# Patient Record
Sex: FEMALE | Race: WHITE | NOT HISPANIC OR LATINO | Employment: OTHER | URBAN - METROPOLITAN AREA
[De-identification: names, ages, dates, MRNs, and addresses within clinical notes are randomized per-mention and may not be internally consistent; named-entity substitution may affect disease eponyms.]

---

## 2019-09-17 PROBLEM — I48.20 CHRONIC ATRIAL FIBRILLATION: Status: ACTIVE | Noted: 2019-09-17

## 2019-09-17 PROBLEM — E04.2 MULTINODULAR GOITER: Status: ACTIVE | Noted: 2019-09-17

## 2019-09-17 PROBLEM — E05.90 HYPERTHYROIDISM: Status: ACTIVE | Noted: 2019-09-17

## 2019-09-17 PROBLEM — I10 ESSENTIAL HYPERTENSION: Status: ACTIVE | Noted: 2019-09-17

## 2022-08-18 ENCOUNTER — OFFICE VISIT (OUTPATIENT)
Dept: NEUROSURGERY | Facility: CLINIC | Age: 67
End: 2022-08-18
Payer: MEDICARE

## 2022-08-18 VITALS
HEIGHT: 68 IN | RESPIRATION RATE: 18 BRPM | SYSTOLIC BLOOD PRESSURE: 116 MMHG | HEART RATE: 64 BPM | DIASTOLIC BLOOD PRESSURE: 76 MMHG | BODY MASS INDEX: 31.67 KG/M2 | WEIGHT: 209 LBS

## 2022-08-18 DIAGNOSIS — M54.32 SCIATICA OF LEFT SIDE: ICD-10-CM

## 2022-08-18 PROCEDURE — 99205 OFFICE O/P NEW HI 60 MIN: CPT | Mod: S$PBB,,, | Performed by: STUDENT IN AN ORGANIZED HEALTH CARE EDUCATION/TRAINING PROGRAM

## 2022-08-18 PROCEDURE — 99205 PR OFFICE/OUTPT VISIT, NEW, LEVL V, 60-74 MIN: ICD-10-PCS | Mod: S$PBB,,, | Performed by: STUDENT IN AN ORGANIZED HEALTH CARE EDUCATION/TRAINING PROGRAM

## 2022-08-18 RX ORDER — GABAPENTIN 300 MG/1
CAPSULE ORAL
Qty: 90 CAPSULE | Refills: 1 | Status: SHIPPED | OUTPATIENT
Start: 2022-08-18 | End: 2023-05-19

## 2022-08-18 NOTE — PROGRESS NOTES
"  [facility]  Clinic Consult     Consult Requested By: Asha Cheung MD  PCP: Flores Pinon MD    SUBJECTIVE:     Chief Complaint:   Chief Complaint   Patient presents with    Lumbar Spine Pain (L-Spine)     Patient presents to clinic with c/o L sided LBP that radiates into the leg.  Patient states she was on vacation and used a short toilet, and when she got up, she felt a "pulling" sensation in the back. Patient states her L leg is sore and has foot numbness.  She also reports feeling "extra cold".        History of Present Illness:  Astrid Camacho is a 67 y.o. female with HTN, CAD, afib maintained on Xarelto, hypothyroidism, DM who presents for evaluation of leg pain and weakness. Patient reports pulling something in the left low back while she was on vacation at the end of July. She used a restroom with a low toilet and when she went to stand she felt pain in the left low back/buttock. The following day she had severe pain in the left leg. The pain is present mostly in the left distal leg, in the distal L5 distribution. She will also experience pain in the buttocks. She saw a chiropractor who is also a NP while out of town who did massage and prescribed a steroid pack. She reports no improvement. She sought evaluation in at the ED upon return and did not improve with pain medication. She was evaluated by ortho and referred to our office after completing MRI. She notes onset of weakness in the left foot 1 week ago. She also reports numbness in the foot and feeling as though her foot is extremely cold. The pain is constant. It keeps her up at night.     Pertinent and recent history, provider evaluations, imaging and data reviewed in EPIC        Past Medical History:   Diagnosis Date    Atrial fibrillation 08/11/2012    Bell's palsy     Coronary artery disease     HTN (hypertension) 05/08/2002    [facility]     Hyperglycemia     Nerve palsy     3rd     " Situational [facility] 10/29/2010    [facility]     Situational depression 10/29/2010    from [facility]      Past Surgical History:   Procedure Laterality Date    APPENDECTOMY      CORONARY STENT PLACEMENT  2005    1 stent from [facility]    HYSTERECTOMY      from [facility] record    THYROIDECTOMY  10/13/2019     Family History   Problem Relation Age of Onset    Hypertension Mother     Pacemaker/defibrilator Mother     Heart attack Father     Prostate cancer Father      Social History     Tobacco Use    Smoking status: Never Smoker    Smokeless tobacco: Never Used   Substance Use Topics    Alcohol use: Yes     Alcohol/week: 1.0 standard drink     Types: 1 Glasses of wine per week     Comment: very little       Review of patient's allergies indicates:   Allergen Reactions    Effexor [venlafaxine]     Codeine Nausea And Vomiting       Current Outpatient Medications:     ALPRAZolam (XANAX) 1 MG tablet, Take 1 mg by mouth as needed. , Disp: , Rfl:     amLODIPine (NORVASC) 5 MG tablet, TAKE 1 TABLET BY MOUTH ONCE DAILY AS DIRECTED FOR 30 DAYS, Disp: , Rfl:     calcitRIOL (ROCALTROL) 0.5 MCG Cap, Take 0.5 mcg by mouth once daily. , Disp: , Rfl:     furosemide (LASIX) 20 MG tablet, Take 20 mg by mouth as needed., Disp: , Rfl:     HYDROcodone-acetaminophen (NORCO) 7.5-325 mg per tablet, Take 1 tablet by mouth every 6 (six) hours as needed for Pain., Disp: 30 tablet, Rfl: 0    levothyroxine (SYNTHROID) 150 MCG tablet, Take 150 mcg by mouth Daily. , Disp: , Rfl:     LIDOcaine (LIDODERM) 5 %, Place 1 patch onto the skin once daily. Remove & Discard patch within 12 hours or as directed by MD, Disp: 15 patch, Rfl: 0    losartan-hydrochlorothiazide 100-25 mg (HYZAAR) 100-25 mg per tablet, Take 1 tablet by mouth once daily., Disp: , Rfl: 3    naproxen (NAPROSYN) 500 MG tablet, Take 1 tablet (500 mg total) by mouth 2 (two) times daily with meals., Disp: 10 tablet, Rfl: 0    oxyCODONE-acetaminophen (PERCOCET)  mg per tablet,  "Take 1 tablet by mouth every 6 (six) hours as needed for Pain., Disp: 25 tablet, Rfl: 0    sotalol (BETAPACE) 120 MG Tab, Take 80 mg by mouth once daily., Disp: , Rfl:     XARELTO 20 mg Tab, TAKE 1 TABLET EVERY DAY WITH FOOD ONCE A DAY ORALLY 90 DAYS, Disp: , Rfl: 2    Review of Systems:   Constitutional: no fever, chills or night sweats. No changes in weight   Eyes: no visual changes   ENT: no nasal congestion or sore throat   Respiratory: no cough or shortness of breath   Cardiovascular: no chest pain or palpitations   Gastrointestinal: no nausea or vomiting   Genitourinary: no hematuria or dysuria   Integument/Breast: no rash or pruritis   Hematologic/Lymphatic: no easy bruising or lymphadenopathy   Musculoskeletal: +back pain   Neurological: no seizures or tremors +weakness   Behavioral/Psych: no auditory or visual hallucinations   Endocrine: no heat or cold intolerance         OBJECTIVE:     Vital Signs (Most Recent):  Pulse: 64 (08/18/22 1315)  Resp: 18 (08/18/22 1315)  BP: 116/76 (08/18/22 1315)  Estimated body mass index is 31.78 kg/m² as calculated from the following:    Height as of this encounter: 5' 8" (1.727 m).    Weight as of this encounter: 94.8 kg (208 lb 15.9 oz).    Physical Exam:   General: well developed, well nourished, no distress.   Neurologic: Alert and oriented. Thought content appropriate. GCS 15.   Language: No aphasia  Speech: No dysarthria  Head: normocephalic, atraumatic  Eyes: EOMI.  Neck: trachea midline, no JVD   Cardiovascular: no LE edema  Pulmonary: normal respirations, no signs of respiratory distress  Abdomen: non-distended  Sensory: allodynia left distal lateral leg   Skin: Skin is warm, dry and intact.    Motor Strength: Moves all extremities spontaneously with good tone. No abnormal movements seen.     Strength  Deltoids Triceps Biceps Wrist Extension Wrist Flexion Hand  Interossei    Upper: R 5/5 5/5 5/5 5/5 5/5 5/5 5/5     L 5/5 5/5 5/5 5/5 5/5 5/5 5/5      Iliopsoas " Quadriceps Knee  Flexion Tibialis  anterior Gastro- cnemius EHL Foot Inversion Foot Eversion   Lower: R 5/5 5/5 5/5 5/5 5/5 5/5      L 5/5 5/5 5/5 4-/5 5/5 4/5 4/5 5/5     DTR's: 1+  Gait: antalgic     Cannot stand on left heel. Able to stand on toes           Diagnostic Results:  I have independently reviewed the following imaging:  MRI: Reviewed  r    L4-5 , low grade 1 spondy normal disc height, facet arthropathy without central stenosis  Moderate lateral recess; [facility] sequestered fragment in the proximal foramin  Right L5-S1 disc osteophyte with PL stenosis      ASSESSMENT/PLAN:     Sciatica of left side  -     Ambulatory referral/consult to Neurosurgery        Astrid Camacho is a 67 y.o. female  With subacute left leg pain and incomplete dorsiflexion weakness, pain is severe  She is ambulating independent with subtle but true weakness  Hx of diabetes  ddx includes Lumbar radiculopathy vs peripheral neuropathy  Lumbar MRI without significant mass effect   L4-5 and 5-1 spondyslosis  However, though not reported by radiology ,   Her MRI is suspicious for a small left L4-5 sequestered disc fragment 4x6 mm  In the proximal foramin, not clearly correlating with L5 compression or large but on left just near the axilla of L4 in combo with 4-5 lateral recess  If so likely very significant inflammatory component as much as mass effect      Options:  1.  Left 4-5 LEELA diagnostic/theraputic , +/- EMG/NCS r/o periphiperal neur    If no lasting - has been counseled on sx  Needs dynamic xrays    2. Surgery reviewed , if no motion on spondy-- Left decompression, exploration discectomy L4-5    She wants to try LEELA   Hx Afib /cardiac needs to be off xarelto; stent over a decade ago no new symptoms          Will let us know response of if new s/s      The diagnosis, goals, limitations, risks and benefits of surgery and alternative treatment options where discussed at length (pros/cons). All questions/concerns  were addressed. The patient has verbalized a good understanding of the diagnosis, the potential procedure, anticipated post-operative course, overall expectations, and risks including but not limited to: spinal cord or nerve root injury/paralysis, death, nerve injury leading to pain or neurological deficit, csf leak, vascular injury or serious bleeding/need for blood product transfusion/stroke, wrong level surgery, chronic pain/failure to improve or worsening of symptoms, infection, need for further surgery at the same or different levels; medical (eg Heart attack, blood clot, infection) and anesthetic complications.                  Patient verbalized understanding of plan. Encouraged to call with any questions or concerns.     This note was partially dictated using voice recognition software, so please excuse any errors that were not corrected.

## 2022-08-18 NOTE — H&P (VIEW-ONLY)
"  [facility]  Clinic Consult     Consult Requested By: Asha Cheung MD  PCP: Flores Pinon MD    SUBJECTIVE:     Chief Complaint:   Chief Complaint   Patient presents with    Lumbar Spine Pain (L-Spine)     Patient presents to clinic with c/o L sided LBP that radiates into the leg.  Patient states she was on vacation and used a short toilet, and when she got up, she felt a "pulling" sensation in the back. Patient states her L leg is sore and has foot numbness.  She also reports feeling "extra cold".        History of Present Illness:  Astrid Camacho is a 67 y.o. female with HTN, CAD, afib maintained on Xarelto, hypothyroidism, DM who presents for evaluation of leg pain and weakness. Patient reports pulling something in the left low back while she was on vacation at the end of July. She used a restroom with a low toilet and when she went to stand she felt pain in the left low back/buttock. The following day she had severe pain in the left leg. The pain is present mostly in the left distal leg, in the distal L5 distribution. She will also experience pain in the buttocks. She saw a chiropractor who is also a NP while out of town who did massage and prescribed a steroid pack. She reports no improvement. She sought evaluation in at the ED upon return and did not improve with pain medication. She was evaluated by ortho and referred to our office after completing MRI. She notes onset of weakness in the left foot 1 week ago. She also reports numbness in the foot and feeling as though her foot is extremely cold. The pain is constant. It keeps her up at night.     Pertinent and recent history, provider evaluations, imaging and data reviewed in EPIC        Past Medical History:   Diagnosis Date    Atrial fibrillation 08/11/2012    Bell's palsy     Coronary artery disease     HT[facility]hypertension[facility]/08/2002    from [facility]     Hyperglycemia     Nerve palsy     3rd     " [facility] 10/29/2010    [facility]     [facility]uational depression 10/29/2010    [facility]      Past Surgical History:   Procedure Laterality Date    APPENDECTOMY      CORONARY STENT PLACEMENT  2005    [facility] stent [facility]    HYSTERECTOMY      [facility]    THYROIDECTOMY  10/13/2019     Family History   Problem Relation Age of Onset    Hypertension Mother     Pacemaker/defibrilator Mother     Heart attack Father     Prostate cancer Father      Social History     Tobacco Use    Smoking status: Never Smoker    Smokeless tobacco: Never Used   Substance Use Topics    Alcohol use: Yes     Alcohol/week: 1.0 standard drink     Types: 1 Glasses of wine per week     Comment: very little       Review of patient's allergies indicates:   Allergen Reactions    Effexor [venlafaxine]     Codeine Nausea And Vomiting       Current Outpatient Medications:     ALPRAZolam (XANAX) 1 MG tablet, Take 1 mg by mouth as needed. , Disp: , Rfl:     amLODIPine (NORVASC) 5 MG tablet, TAKE 1 TABLET BY MOUTH ONCE DAILY AS DIRECTED FOR 30 DAYS, Disp: , Rfl:     calcitRIOL (ROCALTROL) 0.5 MCG Cap, Take 0.5 mcg by mouth once daily. , Disp: , Rfl:     furosemide (LASIX) 20 MG tablet, Take 20 mg by mouth as needed., Disp: , Rfl:     HYDROcodone-acetaminophen (NORCO) 7.5-325 mg per tablet, Take 1 tablet by mouth every 6 (six) hours as needed for Pain., Disp: 30 tablet, Rfl: 0    levothyroxine (SYNTHROID) 150 MCG tablet, Take 150 mcg by mouth Daily. , Disp: , Rfl:     LIDOcaine (LIDODERM) 5 %, Place 1 patch onto the skin once daily. Remove & Discard patch within 12 hours or as directed by MD, Disp: 15 patch, Rfl: 0    losartan-hydrochlorothiazide 100-25 mg (HYZAAR) 100-25 mg per tablet, Take 1 tablet by mouth once daily., Disp: , Rfl: 3    naproxen (NAPROSYN) 500 MG tablet, Take 1 tablet (500 mg total) by mouth 2 (two) times daily with meals., Disp: 10 tablet, Rfl: 0    oxyCODONE-acetaminophen (PERCOCET)  mg per tablet,  "Take 1 tablet by mouth every 6 (six) hours as needed for Pain., Disp: 25 tablet, Rfl: 0    sotalol (BETAPACE) 120 MG Tab, Take 80 mg by mouth once daily., Disp: , Rfl:     XARELTO 20 mg Tab, TAKE 1 TABLET EVERY DAY WITH FOOD ONCE A DAY ORALLY 90 DAYS, Disp: , Rfl: 2    Review of Systems:   Constitutional: no fever, chills or night sweats. No changes in weight   Eyes: no visual changes   ENT: no nasal congestion or sore throat   Respiratory: no cough or shortness of breath   Cardiovascular: no chest pain or palpitations   Gastrointestinal: no nausea or vomiting   Genitourinary: no hematuria or dysuria   Integument/Breast: no rash or pruritis   Hematologic/Lymphatic: no easy bruising or lymphadenopathy   Musculoskeletal: +back pain   Neurological: no seizures or tremors +weakness   Behavioral/Psych: no auditory or visual hallucinations   Endocrine: no heat or cold intolerance         OBJECTIVE:     Vital Signs (Most Recent):  Pulse: 64 (08/18/22 1315)  Resp: 18 (08/18/22 1315)  BP: 116/76 (08/18/22 1315)  Estimated body mass index is 31.78 kg/m² as calculated from the following:    Height as of this encounter: 5' 8" (1.727 m).    Weight as of this encounter: 94.8 kg (208 lb 15.9 oz).    Physical Exam:   General: well developed, well nourished, no distress.   Neurologic: Alert and oriented. Thought content appropriate. GCS 15.   Language: No aphasia  Speech: No dysarthria  Head: normocephalic, atraumatic  Eyes: EOMI.  Neck: trachea midline, no JVD   Cardiovascular: no LE edema  Pulmonary: normal respirations, no signs of respiratory distress  Abdomen: non-distended  Sensory: allodynia left distal lateral leg   Skin: Skin is warm, dry and intact.    Motor Strength: Moves all extremities spontaneously with good tone. No abnormal movements seen.     Strength  Deltoids Triceps Biceps Wrist Extension Wrist Flexion Hand  Interossei    Upper: R 5/5 5/5 5/5 5/5 5/5 5/5 5/5     L 5/5 5/5 5/5 5/5 5/5 5/5 5/5      Iliopsoas " Quadriceps Knee  Flexion Tibialis  anterior Gastro- cnemius EHL Foot Inversion Foot Eversion   Lower: R 5/5 5/5 5/5 5/5 5/5 5/5      L 5/5 5/5 5/5 4-/5 5/5 4/5 4/5 5/5     DTR's: 1+  Gait: antalgic     Cannot stand on left heel. Able to stand on toes           Diagnostic Results:  I have independently reviewed the following imaging:  MRI: Reviewed  r    L4-5 , low grade 1 spondy normal disc height, facet arthropathy without central stenosis  Moderate lateral recess; [facility] sequestered fragment in the proximal foramin  Right L5-S1 disc osteophyte with PL stenosis      ASSESSMENT/PLAN:     Sciatica of left side  -     Ambulatory referral/consult to Neurosurgery        Astrid Camacho is a 67 y.o. female  With subacute left leg pain and incomplete dorsiflexion weakness, pain is severe  She is ambulating independent with subtle but true weakness  Hx of diabetes  ddx includes Lumbar radiculopathy vs peripheral neuropathy  Lumbar MRI without significant mass effect   L4-5 and 5-1 spondyslosis  However, though not reported by radiology ,   Her MRI is suspicious for a small left L4-5 sequestered disc fragment 4x6 mm  In the proximal foramin, not clearly correlating with L5 compression or large but on left just near the axilla of L4 in combo with 4-5 lateral recess  If so likely very significant inflammatory component as much as mass effect      Options:  1.  Left 4-5 LEELA diagnostic/theraputic , +/- EMG/NCS r/o periphiperal neur    If no lasting - has been counseled on sx  Needs dynamic xrays    2. Surgery reviewed , if no motion on spondy-- Left decompression, exploration discectomy L4-5    She wants to try LEELA   Hx Afib /cardiac needs to be off xarelto; stent over a decade ago no new symptoms          Will let us know response of if new s/s      The diagnosis, goals, limitations, risks and benefits of surgery and alternative treatment options where discussed at length (pros/cons). All questions/concerns  were addressed. The patient has verbalized a good understanding of the diagnosis, the potential procedure, anticipated post-operative course, overall expectations, and risks including but not limited to: spinal cord or nerve root injury/paralysis, death, nerve injury leading to pain or neurological deficit, csf leak, vascular injury or serious bleeding/need for blood product transfusion/stroke, wrong level surgery, chronic pain/failure to improve or worsening of symptoms, infection, need for further surgery at the same or different levels; medical (eg Heart attack, blood clot, infection) and anesthetic complications.                  Patient verbalized understanding of plan. Encouraged to call with any questions or concerns.     This note was partially dictated using voice recognition software, so please excuse any errors that were not corrected.

## 2022-08-22 ENCOUNTER — TELEPHONE (OUTPATIENT)
Dept: PAIN MEDICINE | Facility: CLINIC | Age: 67
End: 2022-08-22
Payer: MEDICARE

## 2022-08-22 ENCOUNTER — TELEPHONE (OUTPATIENT)
Dept: NEUROSURGERY | Facility: CLINIC | Age: 67
End: 2022-08-22
Payer: MEDICARE

## 2022-08-22 NOTE — TELEPHONE ENCOUNTER
Discussed with pt that once Dr. Joyner reviews order message, we will reach back out to schedule. Pt verbalized understanding.

## 2022-08-22 NOTE — TELEPHONE ENCOUNTER
Type of Procedure/Injection - Lumbar Transforaminal Epidural  L4/5           Laterality - Left      Type of Sedation - Local      Need to hold medication - yes      Xarelto for 3 days      Clearance needed - yes      Follow up - 2 week

## 2022-08-22 NOTE — TELEPHONE ENCOUNTER
----- Message from Natalie Crump sent at 8/22/2022  8:45 AM CDT -----  Regarding: Advice  Contact: Patient  Type: Needs Medical Advice  Who Called:  Patient  Best Call Back Number: [phone] (home)   Additional Information: Patient is requesting a return call from the nurse to schedule her LEELA (need referral to Pain Management placed) and EMG (Order is under active request tab- I am unable to schedule. Patient states Dr Frey was sending this order outside of [facility]???. Please place the orders and contact the patient to schedule. Patient states these tests are URGENT. Thanks!

## 2022-08-22 NOTE — TELEPHONE ENCOUNTER
----- Message from Natalie Crump sent at 8/22/2022 12:18 PM CDT -----  Regarding: Appointment Request  Contact: Patient  Type: Appointment Request    Name of Caller:  Patient  Symptoms:  LEELA Injection  Best Call Back Number:  [phone] (home)   Additional Information:  Please contact the patient to schedule. Thanks!

## 2022-08-22 NOTE — TELEPHONE ENCOUNTER
----- Message from Natalie Crump sent at 8/22/2022 12:18 PM CDT -----  Regarding: Appointment Request  Contact: Patient  Type: Appointment Request    Name of Caller:  Patient  Symptoms:  LEELA Injection  Best Call Back Number:  [phone] (home)   Additional Information:  Please contact the patient to schedule. Thanks!

## 2022-08-22 NOTE — TELEPHONE ENCOUNTER
Pt was seen by Dr. Frey and referred to Pain management. Message forwarded to Dr. Joyner for review.

## 2022-08-23 ENCOUNTER — TELEPHONE (OUTPATIENT)
Dept: PAIN MEDICINE | Facility: CLINIC | Age: 67
End: 2022-08-23

## 2022-08-23 DIAGNOSIS — M54.16 LUMBAR RADICULOPATHY: Primary | ICD-10-CM

## 2022-08-23 RX ORDER — ALPRAZOLAM 0.5 MG/1
1 TABLET, ORALLY DISINTEGRATING ORAL ONCE AS NEEDED
Status: CANCELLED | OUTPATIENT
Start: 2022-08-26 | End: 2034-01-21

## 2022-08-23 NOTE — TELEPHONE ENCOUNTER
Spoke with patient and scheduled procedure, discussed all questions and instructions. Faxed clearance for medication, she states she is already holding her xeralto starting yesterday.

## 2022-08-26 ENCOUNTER — HOSPITAL ENCOUNTER (OUTPATIENT)
Facility: HOSPITAL | Age: 67
Discharge: HOME | End: 2022-08-26
Attending: ANESTHESIOLOGY | Admitting: ANESTHESIOLOGY
Payer: MEDICARE

## 2022-08-26 ENCOUNTER — HOSPITAL ENCOUNTER (OUTPATIENT)
Dept: RADIOLOGY | Facility: HOSPITAL | Age: 67
Discharge: HOME | End: 2022-08-26
Attending: ANESTHESIOLOGY
Payer: MEDICARE

## 2022-08-26 DIAGNOSIS — M54.16 LUMBAR RADICULOPATHY: ICD-10-CM

## 2022-08-26 DIAGNOSIS — M51.36 DDD (DEGENERATIVE DISC DISEASE), LUMBAR: ICD-10-CM

## 2022-08-26 PROCEDURE — 64483 PR EPIDURAL INJ, ANES/STEROID, TRANSFORAMINAL, LUMB/SACR, SNGL LEVL: ICD-10-PCS | Mod: LT,,, | Performed by: ANESTHESIOLOGY

## 2022-08-26 PROCEDURE — 64483 NJX AA&/STRD TFRM EPI L/S 1: CPT | Mod: LT,,, | Performed by: ANESTHESIOLOGY

## 2022-08-26 PROCEDURE — 63600175 PHARM REV CODE 636 W HCPCS: Mod: PO | Performed by: ANESTHESIOLOGY

## 2022-08-26 PROCEDURE — 25500020 PHARM REV CODE 255: Mod: PO | Performed by: ANESTHESIOLOGY

## 2022-08-26 PROCEDURE — 76000 FLUOROSCOPY <1 HR PHYS/QHP: CPT | Mod: TC,PO

## 2022-08-26 PROCEDURE — 64483 NJX AA&/STRD TFRM EPI L/S 1: CPT | Mod: PO | Performed by: ANESTHESIOLOGY

## 2022-08-26 PROCEDURE — 25000003 PHARM REV CODE 250: Mod: PO | Performed by: ANESTHESIOLOGY

## 2022-08-26 RX ORDER — BUPIVACAINE HYDROCHLORIDE 2.5 MG/ML
INJECTION, SOLUTION EPIDURAL; INFILTRATION; INTRACAUDAL
Status: DISCONTINUED | OUTPATIENT
Start: 2022-08-26 | End: 2022-08-26 | Stop reason: HOSPADM

## 2022-08-26 RX ORDER — ALPRAZOLAM 0.5 MG/1
1 TABLET, ORALLY DISINTEGRATING ORAL ONCE AS NEEDED
Status: COMPLETED | OUTPATIENT
Start: 2022-08-26 | End: 2022-08-26

## 2022-08-26 RX ORDER — IBUPROFEN 200 MG
200 TABLET ORAL EVERY 6 HOURS PRN
COMMUNITY
End: 2023-05-19

## 2022-08-26 RX ORDER — METHYLPREDNISOLONE ACETATE 40 MG/ML
INJECTION, SUSPENSION INTRA-ARTICULAR; INTRALESIONAL; INTRAMUSCULAR; SOFT TISSUE
Status: DISCONTINUED | OUTPATIENT
Start: 2022-08-26 | End: 2022-08-26 | Stop reason: HOSPADM

## 2022-08-26 RX ORDER — LIDOCAINE HYDROCHLORIDE 10 MG/ML
INJECTION, SOLUTION EPIDURAL; INFILTRATION; INTRACAUDAL; PERINEURAL
Status: DISCONTINUED | OUTPATIENT
Start: 2022-08-26 | End: 2022-08-26 | Stop reason: HOSPADM

## 2022-08-26 RX ADMIN — ALPRAZOLAM 1 MG: 0.5 TABLET, ORALLY DISINTEGRATING ORAL at 03:08

## 2022-08-26 NOTE — DISCHARGE SUMMARY
[facility] - Surgery  Discharge Note  Short Stay    Procedure(s) (LRB):  Injection,steroid,epidural,transforaminal approach L4/5 (Left)    OUTCOME: Patient tolerated treatment/procedure well without complication and is now ready for discharge.    DISPOSITION: Home or Self Care    FINAL DIAGNOSIS:  Lumbar radiculopathy    FOLLOWUP: In clinic    DISCHARGE INSTRUCTIONS:    Discharge Procedure Orders   Diet Adult Regular     No dressing needed     Notify your health care provider if you experience any of the following:  temperature >100.4     Activity as tolerated

## 2022-08-26 NOTE — PLAN OF CARE
VSS, all questions answered. Denies recent fever or illness. Pt states ready for procedure.    Dr. Joyner notified of patient's last dose of ibuprofen.

## 2022-08-26 NOTE — OP NOTE
PROCEDURE DATE: 8/26/2022    PROCEDURE: Left L4/5 transforaminal epidural steroid injection under fluoroscopy    DIAGNOSIS: Lumbar  Radiculopathy    Post op diagnosis: Same    PHYSICIAN: Chacho Joyner MD    MEDICATIONS INJECTED:  Methylprednisolone 40mg (1ml) and 1ml 0.25% bupivicaine at each nerve root.     LOCAL ANESTHETIC INJECTED:  Lidocaine 1%. 4 ml per site.    SEDATION MEDICATIONS: none    ESTIMATED BLOOD LOSS:  none    COMPLICATIONS:  none    TECHNIQUE:   A time-out was taken to identify patient and procedure side prior to starting the procedure. The patient was placed in a prone position, prepped and draped in the usual sterile fashion using ChloraPrep and sterile towels.  The area to be injected was determined under fluoroscopic guidance in AP and oblique view.  Local anesthetic was given by raising a wheal and going down to the hub of a 25-gauge 1.5 inch needle.  In oblique view, a 5 inch 22-gauge bent-tip spinal needle was introduced towards 6 oclock position of the pedicle of each above named nerve root level.  The needle was walked medially then hinged into the neural foramen and position was confirmed in AP and lateral views.  Omnipaque contrast dye was injected to confirm appropriate placement and that there was no vascular uptake.  After negative aspiration for blood or CSF, the medication was then injected. This was performed at the left L4/5 level(s). The patient tolerated the procedure well.    The patient was monitored after the procedure.  Patient was given post procedure and discharge instructions to follow at home. The patient was discharged in a stable condition.

## 2022-08-29 VITALS
TEMPERATURE: 97 F | SYSTOLIC BLOOD PRESSURE: 166 MMHG | DIASTOLIC BLOOD PRESSURE: 77 MMHG | RESPIRATION RATE: 18 BRPM | WEIGHT: 205 LBS | HEART RATE: 63 BPM | OXYGEN SATURATION: 97 % | HEIGHT: 68 IN | BODY MASS INDEX: 31.07 KG/M2

## 2022-08-31 ENCOUNTER — TELEPHONE (OUTPATIENT)
Dept: NEUROSURGERY | Facility: CLINIC | Age: 67
End: 2022-08-31
Payer: MEDICARE

## 2022-08-31 NOTE — TELEPHONE ENCOUNTER
Patient reports that she has had minimal relief from injection. Appointment scheduled for patient to come in to discuss next step

## 2022-09-01 ENCOUNTER — OFFICE VISIT (OUTPATIENT)
Dept: NEUROSURGERY | Facility: CLINIC | Age: 67
End: 2022-09-01
Payer: MEDICARE

## 2022-09-01 VITALS
SYSTOLIC BLOOD PRESSURE: 135 MMHG | WEIGHT: 205 LBS | HEART RATE: 63 BPM | HEIGHT: 68 IN | DIASTOLIC BLOOD PRESSURE: 84 MMHG | BODY MASS INDEX: 31.07 KG/M2 | RESPIRATION RATE: 14 BRPM

## 2022-09-01 DIAGNOSIS — M43.16 SPONDYLOLISTHESIS OF LUMBAR REGION: ICD-10-CM

## 2022-09-01 DIAGNOSIS — M51.26 LUMBAR DISC HERNIATION: ICD-10-CM

## 2022-09-01 DIAGNOSIS — M51.36 DDD (DEGENERATIVE DISC DISEASE), LUMBAR: Primary | ICD-10-CM

## 2022-09-01 PROCEDURE — 99215 PR OFFICE/OUTPT VISIT, EST, LEVL V, 40-54 MIN: ICD-10-PCS | Mod: S$PBB,,, | Performed by: STUDENT IN AN ORGANIZED HEALTH CARE EDUCATION/TRAINING PROGRAM

## 2022-09-01 PROCEDURE — 99215 OFFICE O/P EST HI 40 MIN: CPT | Mod: S$PBB,,, | Performed by: STUDENT IN AN ORGANIZED HEALTH CARE EDUCATION/TRAINING PROGRAM

## 2022-09-01 NOTE — PROGRESS NOTES
[facility]  Clinic Progress Note       PCP: Flores Pinon MD    SUBJECTIVE:     Chief Complaint:       Interval History 9/1/2022  Patient returns today after completing EMG and receiving TFESI left L4-5. She reports improvement in back pain. She states it provided minimal relief in the leg. She reports no change in weakness.     History of Present Illness 8/18/2022  Astird Camacho is a 67 y.o. female with HTN, CAD, afib maintained on Xarelto, hypothyroidism, DM who presents for evaluation of leg pain and weakness. Patient reports pulling something in the left low back while she was on vacation at the end of July. She used a restroom with a low toilet and when she went to stand she felt pain in the left low back/buttock. The following day she had severe pain in the left leg. The pain is present mostly in the left distal leg, in the distal L5 distribution. She will also experience pain in the buttocks. She saw a chiropractor who is also a NP while out of town who did massage and prescribed a steroid pack. She reports no improvement. She sought evaluation in at the ED upon return and did not improve with pain medication. She was evaluated by ortho and referred to our office after completing MRI. She notes onset of weakness in the left foot 1 week ago. She also reports numbness in the foot and feeling as though her foot is extremely cold. The pain is constant. It keeps her up at night.     Pertinent and recent history, provider evaluations, imaging and data reviewed in EPIC        Past Medical History:   Diagnosis Date    Atrial fibrillation 08/11/2012    Bell's palsy     Coronary artery disease     HTN (hypertension) 05/08/2002    from [facility]     Hyperglycemia     Nerve palsy     3rd     Situational anxiety 10/29/2010    from [facility]     [facility]ational [facility] 10/29/2010    [facility]      Past Surgical History:   Procedure Laterality Date    APPENDECTOMY      CORONARY STENT  PLACEMENT  2005    1 stent from [facility]    HYSTERECTOMY      [facility]    THYROIDECTOMY  10/13/2019    TRANSFORAMINAL EPIDURAL INJECTION OF STEROID Left 8/26/2022    Procedure: Injection,steroid,epidural,transforaminal approach L4/5;  Surgeon: Chacho Joyner MD;  Location: [facility];  Service: Pain Management;  Laterality: Left;     Family History   Problem Relation Age of Onset    Hypertension Mother     Pacemaker/defibrilator Mother     Heart attack Father     Prostate cancer Father      Social History     Tobacco Use    Smoking status: Never    Smokeless tobacco: Never   Substance Use Topics    Alcohol use: Yes     Alcohol/week: 1.0 standard drink     Types: 1 Glasses of wine per week     Comment: very little     Drug use: Not Currently      Review of patient's allergies indicates:   Allergen Reactions    Effexor [venlafaxine]     Codeine Nausea And Vomiting       Current Outpatient Medications:     ALPRAZolam (XANAX) 1 MG tablet, Take 1 mg by mouth as needed. , Disp: , Rfl:     amLODIPine (NORVASC) 5 MG tablet, TAKE 1 TABLET BY MOUTH ONCE DAILY AS DIRECTED FOR 30 DAYS, Disp: , Rfl:     calcitRIOL (ROCALTROL) 0.5 MCG Cap, Take 0.5 mcg by mouth once daily. , Disp: , Rfl:     furosemide (LASIX) 20 MG tablet, Take 20 mg by mouth as needed., Disp: , Rfl:     gabapentin (NEURONTIN) 300 MG capsule, Take 1 cap PO QHS x1wk, then 1 cap PO BID x1wk, then 1 cap PO TID, Disp: 90 capsule, Rfl: 1    ibuprofen (ADVIL,MOTRIN) 200 MG tablet, Take 200 mg by mouth every 6 (six) hours as needed for Pain., Disp: , Rfl:     levothyroxine (SYNTHROID) 150 MCG tablet, Take 150 mcg by mouth Daily. , Disp: , Rfl:     losartan-hydrochlorothiazide 100-25 mg (HYZAAR) 100-25 mg per tablet, Take 1 tablet by mouth once daily., Disp: , Rfl: 3    sotalol (BETAPACE) 120 MG Tab, Take 80 mg by mouth once daily., Disp: , Rfl:     XARELTO 20 mg Tab, TAKE 1 TABLET EVERY DAY WITH FOOD ONCE A DAY ORALLY 90 DAYS, Disp: , Rfl: 2    Review of Systems:  "  Constitutional: no fever, chills or night sweats. No changes in weight   Eyes: no visual changes   ENT: no nasal congestion or sore throat   Respiratory: no cough or shortness of breath   Cardiovascular: no chest pain or palpitations   Gastrointestinal: no nausea or vomiting   Genitourinary: no hematuria or dysuria   Integument/Breast: no rash or pruritis   Hematologic/Lymphatic: no easy bruising or lymphadenopathy   Musculoskeletal: +back pain   Neurological: no seizures or tremors +weakness   Behavioral/Psych: no auditory or visual hallucinations   Endocrine: no heat or cold intolerance         OBJECTIVE:     Vital Signs (Most Recent):  Pulse: 63 (09/01/22 1405)  Resp: 14 (09/01/22 1405)  BP: 135/84 (09/01/22 1405)  Estimated body mass index is 31.17 kg/m² as calculated from the following:    Height as of 8/25/22: 5' 8" (1.727 m).    Weight as of 8/25/22: 93 kg (205 lb).    Physical Exam:   General: well developed, well nourished, no distress.   Neurologic: Alert and oriented. Thought content appropriate. GCS 15.   Language: No aphasia  Speech: No dysarthria  Head: normocephalic, atraumatic  Eyes: EOMI.  Neck: trachea midline, no JVD   Cardiovascular: no LE edema  Pulmonary: normal respirations, no signs of respiratory distress  Abdomen: non-distended  Sensory: allodynia left distal lateral leg   Skin: Skin is warm, dry and intact.    Motor Strength: Moves all extremities spontaneously with good tone. No abnormal movements seen.     Strength  Deltoids Triceps Biceps Wrist Extension Wrist Flexion Hand  Interossei    Upper: R 5/5 5/5 5/5 5/5 5/5 5/5 5/5     L 5/5 5/5 5/5 5/5 5/5 5/5 5/5      Iliopsoas Quadriceps Knee  Flexion Tibialis  anterior Gastro- cnemius EHL Foot Inversion Foot Eversion   Lower: R 5/5 5/5 5/5 5/5 5/5 5/5      L 5/5 5/5 5/5 4/5 5/5 4/5 4/5 5/5     DTR's: 1+  Gait: antalgic     Cannot stand on left heel. Able to stand on toes           Diagnostic Results:  I have independently reviewed the " following imaging:  MRI: Reviewed  r    L4-5 , low grade 1 spondy normal disc height, facet arthropathy without central stenosis  Moderate lateral recess; appears to be a samll 6x4mm sequestered fragment in the proximal foramin  Right L5-S1 disc osteophyte with PL stenosis  FINDINGS:  Minimal lateral curvature convex to the right.  No fracture.  Vertebral body height is maintained.  L4-5 demonstrates mild moderate disc space narrowing, anterolisthesis in the flexion position measuring 5 mm, and in the extension position measuring approximately 4 mm..  L5-S1 demonstrates mild disc space narrowing.  Suspected facet arthrosis lower lumbar spine.  Moderate atherosclerotic calcification.     Impression:     No acute findings.  Slightly dynamic anterolisthesis L4-5.  Spondylosis as above..        Electronically signed by: Maria Esther Garsia MD  Date:                                            08/20/2022  Time:                                           10:34    ASSESSMENT/PLAN:     There are no diagnoses linked to this encounter.      Astrid Camacho is a 67 y.o. female  With subacute left leg pain and incomplete dorsiflexion weakness, pain is severe  She is ambulating independent with subtle but true weakness  Hx of diabetes  ddx includes Lumbar radiculopathy vs peripheral neuropathy  Lumbar MRI without significant mass effect   L4-5 and 5-1 spondyslosis  However, though not reported by radiology ,   Her MRI is suspicious for a small left L4-5 sequestered disc fragment 4x6 mm  In the proximal foramin, not clearly correlating with L5 compression or large but on left just near the axilla of L4 in combo with 4-5 lateral recess  If so likely very significant inflammatory component as much as mass effect      Options:  1.  Left 4-5 LEELA diagnostic/theraputic , +/- EMG/NCS r/o periphiperal neur    If no lasting - has been counseled on sx  Needs dynamic xrays    2. Surgery reviewed , facetectomy or interbody for decompression,  needing fusion    She wants to try LEELA   Hx Afib /cardiac needs to be off xarelto; stent over a decade ago no new symptoms    Overall better but still symptomatic with a dynamic component and spondylolisthesis  Does not want surgery and verbalized understanding of the risk of conservative tx -progression, permanent deficit ect  L5 radiculopathy , feels like shes doing better and wants to continue PT, pain mg      Will let us know response of if new s/s      The diagnosis, goals, limitations, risks and benefits of surgery and alternative treatment options where discussed at length (pros/cons). All questions/concerns were addressed. The patient has verbalized a good understanding of the diagnosis, the potential procedure, anticipated post-operative course, overall expectations, and risks including but not limited to: spinal cord or nerve root injury/paralysis, death, nerve injury leading to pain or neurological deficit, csf leak, vascular injury or serious bleeding/need for blood product transfusion/stroke, wrong level surgery, chronic pain/failure to improve or worsening of symptoms, infection, need for further surgery at the same or different levels; medical (eg Heart attack, blood clot, infection) and anesthetic complications.                  Patient verbalized understanding of plan. Encouraged to call with any questions or concerns.     This note was partially dictated using voice recognition software, so please excuse any errors that were not corrected.

## 2022-09-22 ENCOUNTER — OFFICE VISIT (OUTPATIENT)
Dept: PAIN MEDICINE | Facility: CLINIC | Age: 67
End: 2022-09-22
Payer: MEDICARE

## 2022-09-22 VITALS
OXYGEN SATURATION: 95 % | HEIGHT: 68 IN | HEART RATE: 67 BPM | BODY MASS INDEX: 31 KG/M2 | SYSTOLIC BLOOD PRESSURE: 138 MMHG | DIASTOLIC BLOOD PRESSURE: 81 MMHG | WEIGHT: 204.56 LBS

## 2022-09-22 DIAGNOSIS — M54.16 LUMBAR RADICULOPATHY: Primary | ICD-10-CM

## 2022-09-22 DIAGNOSIS — M54.16 LEFT LUMBAR RADICULOPATHY: ICD-10-CM

## 2022-09-22 DIAGNOSIS — M43.16 SPONDYLOLISTHESIS OF LUMBAR REGION: ICD-10-CM

## 2022-09-22 PROCEDURE — 99214 PR OFFICE/OUTPT VISIT, EST, LEVL IV, 30-39 MIN: ICD-10-PCS | Mod: S$PBB,,, | Performed by: ANESTHESIOLOGY

## 2022-09-22 PROCEDURE — 99999 PR PBB SHADOW E&M-EST. PATIENT-LVL IV: ICD-10-PCS | Mod: PBBFAC,,, | Performed by: ANESTHESIOLOGY

## 2022-09-22 PROCEDURE — 99999 PR PBB SHADOW E&M-EST. PATIENT-LVL IV: CPT | Mod: PBBFAC,,, | Performed by: ANESTHESIOLOGY

## 2022-09-22 PROCEDURE — 99214 OFFICE O/P EST MOD 30 MIN: CPT | Mod: PBBFAC,PN | Performed by: ANESTHESIOLOGY

## 2022-09-22 PROCEDURE — 99214 OFFICE O/P EST MOD 30 MIN: CPT | Mod: S$PBB,,, | Performed by: ANESTHESIOLOGY

## 2022-09-22 RX ORDER — DABIGATRAN ETEXILATE 150 MG/1
CAPSULE ORAL
Status: ON HOLD | COMMUNITY
End: 2022-10-07 | Stop reason: HOSPADM

## 2022-09-22 RX ORDER — METFORMIN HYDROCHLORIDE 500 MG/1
TABLET, EXTENDED RELEASE ORAL
COMMUNITY
End: 2023-06-13

## 2022-09-22 RX ORDER — ALPRAZOLAM 0.5 MG/1
1 TABLET, ORALLY DISINTEGRATING ORAL ONCE AS NEEDED
Status: CANCELLED | OUTPATIENT
Start: 2022-09-22 | End: 2034-02-18

## 2022-09-22 RX ORDER — METHYLPREDNISOLONE 4 MG/1
TABLET ORAL
Status: ON HOLD | COMMUNITY
Start: 2022-07-27 | End: 2022-10-07 | Stop reason: HOSPADM

## 2022-09-22 NOTE — PROGRESS NOTES
This note was completed with dictation software and grammatical errors may exist.    Chief Complaint   Patient presents with    Follow-up        HPI: Astrid Camacho is a 67 y.o. year old female patient who has a past medical history of Atrial fibrillation, Bell's palsy, Coronary artery disease, HTN (hypertension), Hyperglycemia, Nerve palsy, Situational anxiety, and Situational depression. She presents in referral from Dr. Leonard Frey for back and left leg pain and weakness.  The patient reports that she has had back and left leg pain for about 2 months prior to seeing Dr. Leonard Frey.  She began having pain in the low back, left lateral leg and lower leg, the pain the back was not severe.  The pain in the leg was more intense and she started developing issues with the left leg not picking up like her right leg.  She states that the pain is much worse with sitting for long periods and also moving from sitting to standing.  She was having some back pain here and there but it was not unbearable.  She reports that she gets numbness in her left foot and weakness in the left leg.  The pain is worse with lifting, worse at night.  She gets some relief with heat and the injection.      Pain intervention history:  She is status post left L4/5 transforaminal injection on 08/26/2022 with 70% relief of her left leg pain, almost 100% relief of her back pain.    Spine surgeries:    Antineuropathics:  Gabapentin 300 3 times daily  NSAIDs:  Ibuprofen  Physical therapy:  She would seen a chiropractor, has not done formal physical therapy  Antidepressants:  Muscle relaxers:  Opioids:  Antiplatelets/Anticoagulants:  Xarelto 20        ROS:  She reports back pain only.  Balance of review of systems is negative.    No results found for: LABA1C, HGBA1C    No results found for: WBC, HGB, HCT, MCV, PLT          Past Medical History:   Diagnosis Date    Atrial fibrillation 08/11/2012    Bell's palsy     Coronary artery disease     HTN  "(hypertension) 05/08/2002    from [facility]     Hyperglycemia     Nerve palsy     3rd     Situational anxiety 10/29/2010    from [facility]     [facility]ational depression 10/29/2010    [facility]        Past Surgical History:   Procedure Laterality Date    APPENDECTOMY      CORONARY STENT PLACEMENT  2005    1 stent from [facility]    HYSTERECTOMY      from [facility] record    THYROIDECTOMY  10/13/2019    TRANSFORAMINAL EPIDURAL INJECTION OF STEROID Left 8/26/2022    Procedure: Injection,steroid,epidural,transforaminal approach L4/5;  Surgeon: Chacho Joyner MD;  Location: [facility] OR;  Service: Pain Management;  Laterality: Left;       Social History     Socioeconomic History    Marital status:    Tobacco Use    Smoking status: Never    Smokeless tobacco: Never   Substance and Sexual Activity    Alcohol use: Yes     Alcohol/week: 1.0 standard drink     Types: 1 Glasses of wine per week     Comment: very little     Drug use: Not Currently         Medications/Allergies: See med card    Vitals:    09/22/22 1019   BP: 138/81   Pulse: 67   SpO2: 95%   Weight: 92.8 kg (204 lb 9.4 oz)   Height: 5' 8" (1.727 m)   PainSc: 0-No pain     Body mass index is 31.11 kg/m².    Physical exam:    Gen: A and O x3, pleasant, well-groomed  Skin: No rashes or obvious lesions  HEENT: PERRLA, no obvious deformities on ears or in canals. Trachea midline.  CVS: Regular rate and rhythm, normal palpable pulses.  Resp:No increased work of breathing, symmetrical chest rise.  Abdomen: Soft, NT/ND.  Musculoskeletal: No antalgic gait.           Neuro:    Iliopsoas Quadriceps Knee  Flexion Tibialis  anterior Gastro- cnemius EHL   Lower: R 5/5 5/5 5/5 5/5 5/5 5/5    L 5/5 5/5 5/5 5/5 5/5 4/5      Left  Right    Patellar DTR 0+ 0+   Achilles DTR 0+ 0+                    Intact and symmetrical to light touch and pinprick in L1-S1 dermatomes bilaterally.    Lumbar spine:  Lumbar spine: ROM is full with flexion extension and oblique extension with increased pain on extension " with low back pain   Jose's test causes no increased pain on either side.    Supine straight leg raise is negative bilaterally.    Internal and external rotation of the hip causes no increased pain on either side.  Myofascial exam: No tenderness to palpation across lumbar paraspinous muscles.    Imaging:  MRI lumbar spine 08/08/2022:  There is a grade 1 anterolisthesis of L4 on L5 with bilateral foraminal narrowing, worse on the left side with significant facet hypertrophy especially on the left side compared to the right side at that level as well.    Assessment:  Astrid Camacho is a 67 y.o. year old female patient who has a past medical history of Atrial fibrillation, Bell's palsy, Coronary artery disease, HTN (hypertension), Hyperglycemia, Nerve palsy, Situational anxiety, and Situational depression. She presents in referral from Dr. Leonard Frey for back and left leg pain and weakness.     1. Lumbar radiculopathy  Vital signs    Verify informed consent    Notify physician     Notify physician     Notify physician (specify)    Diet NPO    Case Request Operating Room: Injection,steroid,epidural,transforaminal approach L4/5    Place in Outpatient    alprazolam ODT dissolvable tablet 1 mg          Plan:  1. We discussed that she had good relief of her back pain and she is having improvement in her left leg but still has weakness.  We are going to set her up with 1 more left L4/5 transforaminal injection and discussed that if this continues to improve, she may be able to hold off on any surgery.  However if the weakness does not improve, I am going to have her return to see Dr. Frey.  She will follow up in 3 weeks after her injection.      Thank you for referring this interesting patient, and I look forward to continuing to collaborate in her care.

## 2022-09-22 NOTE — H&P (VIEW-ONLY)
This note was completed with dictation software and grammatical errors may exist.    Chief Complaint   Patient presents with    Follow-up        HPI: Astrid Camacho is a 67 y.o. year old female patient who has a past medical history of Atrial fibrillation, Bell's palsy, Coronary artery disease, HTN (hypertension), Hyperglycemia, Nerve palsy, Situational anxiety, and Situational depression. She presents in referral from Dr. Leonard Frey for back and left leg pain and weakness.  The patient reports that she has had back and left leg pain for about 2 months prior to seeing Dr. Leonard Frey.  She began having pain in the low back, left lateral leg and lower leg, the pain the back was not severe.  The pain in the leg was more intense and she started developing issues with the left leg not picking up like her right leg.  She states that the pain is much worse with sitting for long periods and also moving from sitting to standing.  She was having some back pain here and there but it was not unbearable.  She reports that she gets numbness in her left foot and weakness in the left leg.  The pain is worse with lifting, worse at night.  She gets some relief with heat and the injection.      Pain intervention history:  She is status post left L4/5 transforaminal injection on 08/26/2022 with 70% relief of her left leg pain, almost 100% relief of her back pain.    Spine surgeries:    Antineuropathics:  Gabapentin 300 3 times daily  NSAIDs:  Ibuprofen  Physical therapy:  She would seen a chiropractor, has not done formal physical therapy  Antidepressants:  Muscle relaxers:  Opioids:  Antiplatelets/Anticoagulants:  Xarelto 20        ROS:  She reports back pain only.  Balance of review of systems is negative.    No results found for: LABA1C, HGBA1C    No results found for: WBC, HGB, HCT, MCV, PLT          Past Medical History:   Diagnosis Date    Atrial fibrillation 08/11/2012    Bell's palsy     Coronary artery disease     HTN  "(hypertension) 05/08/2002    from [facility]     Hyperglycemia     Nerve palsy     3rd     Situational anxiety 10/29/2010    from [facility]     [facility]ational depression 10/29/2010    [facility]        Past Surgical History:   Procedure Laterality Date    APPENDECTOMY      CORONARY STENT PLACEMENT  2005    1 stent from [facility]    HYSTERECTOMY      from [facility] record    THYROIDECTOMY  10/13/2019    TRANSFORAMINAL EPIDURAL INJECTION OF STEROID Left 8/26/2022    Procedure: Injection,steroid,epidural,transforaminal approach L4/5;  Surgeon: Chacho Joyner MD;  Location: [facility] OR;  Service: Pain Management;  Laterality: Left;       Social History     Socioeconomic History    Marital status:    Tobacco Use    Smoking status: Never    Smokeless tobacco: Never   Substance and Sexual Activity    Alcohol use: Yes     Alcohol/week: 1.0 standard drink     Types: 1 Glasses of wine per week     Comment: very little     Drug use: Not Currently         Medications/Allergies: See med card    Vitals:    09/22/22 1019   BP: 138/81   Pulse: 67   SpO2: 95%   Weight: 92.8 kg (204 lb 9.4 oz)   Height: 5' 8" (1.727 m)   PainSc: 0-No pain     Body mass index is 31.11 kg/m².    Physical exam:    Gen: A and O x3, pleasant, well-groomed  Skin: No rashes or obvious lesions  HEENT: PERRLA, no obvious deformities on ears or in canals. Trachea midline.  CVS: Regular rate and rhythm, normal palpable pulses.  Resp:No increased work of breathing, symmetrical chest rise.  Abdomen: Soft, NT/ND.  Musculoskeletal: No antalgic gait.           Neuro:    Iliopsoas Quadriceps Knee  Flexion Tibialis  anterior Gastro- cnemius EHL   Lower: R 5/5 5/5 5/5 5/5 5/5 5/5    L 5/5 5/5 5/5 5/5 5/5 4/5      Left  Right    Patellar DTR 0+ 0+   Achilles DTR 0+ 0+                    Intact and symmetrical to light touch and pinprick in L1-S1 dermatomes bilaterally.    Lumbar spine:  Lumbar spine: ROM is full with flexion extension and oblique extension with increased pain on extension " with low back pain   Jose's test causes no increased pain on either side.    Supine straight leg raise is negative bilaterally.    Internal and external rotation of the hip causes no increased pain on either side.  Myofascial exam: No tenderness to palpation across lumbar paraspinous muscles.    Imaging:  MRI lumbar spine 08/08/2022:  There is a grade 1 anterolisthesis of L4 on L5 with bilateral foraminal narrowing, worse on the left side with significant facet hypertrophy especially on the left side compared to the right side at that level as well.    Assessment:  Astrid Camacho is a 67 y.o. year old female patient who has a past medical history of Atrial fibrillation, Bell's palsy, Coronary artery disease, HTN (hypertension), Hyperglycemia, Nerve palsy, Situational anxiety, and Situational depression. She presents in referral from Dr. Leonard Frey for back and left leg pain and weakness.     1. Lumbar radiculopathy  Vital signs    Verify informed consent    Notify physician     Notify physician     Notify physician (specify)    Diet NPO    Case Request Operating Room: Injection,steroid,epidural,transforaminal approach L4/5    Place in Outpatient    alprazolam ODT dissolvable tablet 1 mg          Plan:  1. We discussed that she had good relief of her back pain and she is having improvement in her left leg but still has weakness.  We are going to set her up with 1 more left L4/5 transforaminal injection and discussed that if this continues to improve, she may be able to hold off on any surgery.  However if the weakness does not improve, I am going to have her return to see Dr. Frey.  She will follow up in 3 weeks after her injection.      Thank you for referring this interesting patient, and I look forward to continuing to collaborate in her care.

## 2022-10-07 ENCOUNTER — HOSPITAL ENCOUNTER (OUTPATIENT)
Dept: RADIOLOGY | Facility: HOSPITAL | Age: 67
Discharge: HOME | End: 2022-10-07
Attending: ANESTHESIOLOGY | Admitting: ANESTHESIOLOGY
Payer: MEDICARE

## 2022-10-07 ENCOUNTER — HOSPITAL ENCOUNTER (OUTPATIENT)
Facility: HOSPITAL | Age: 67
Discharge: HOME | End: 2022-10-07
Attending: ANESTHESIOLOGY | Admitting: ANESTHESIOLOGY
Payer: MEDICARE

## 2022-10-07 VITALS
TEMPERATURE: 98 F | SYSTOLIC BLOOD PRESSURE: 137 MMHG | WEIGHT: 204 LBS | HEIGHT: 68 IN | DIASTOLIC BLOOD PRESSURE: 70 MMHG | HEART RATE: 65 BPM | RESPIRATION RATE: 16 BRPM | OXYGEN SATURATION: 97 % | BODY MASS INDEX: 30.92 KG/M2

## 2022-10-07 DIAGNOSIS — M54.16 LUMBAR RADICULOPATHY: ICD-10-CM

## 2022-10-07 DIAGNOSIS — M54.50 LOWER BACK PAIN: ICD-10-CM

## 2022-10-07 LAB — GLUCOSE SERPL-MCNC: 100 MG/DL (ref 70–110)

## 2022-10-07 PROCEDURE — 64483 NJX AA&/STRD TFRM EPI L/S 1: CPT | Mod: LT,,, | Performed by: ANESTHESIOLOGY

## 2022-10-07 PROCEDURE — 82962 GLUCOSE BLOOD TEST: CPT | Mod: PO | Performed by: ANESTHESIOLOGY

## 2022-10-07 PROCEDURE — 64483 NJX AA&/STRD TFRM EPI L/S 1: CPT | Mod: PO | Performed by: ANESTHESIOLOGY

## 2022-10-07 PROCEDURE — 25000003 PHARM REV CODE 250: Mod: PO | Performed by: ANESTHESIOLOGY

## 2022-10-07 PROCEDURE — 76000 FLUOROSCOPY <1 HR PHYS/QHP: CPT | Mod: TC,PO

## 2022-10-07 PROCEDURE — 63600175 PHARM REV CODE 636 W HCPCS: Mod: PO | Performed by: ANESTHESIOLOGY

## 2022-10-07 PROCEDURE — 64483 PR EPIDURAL INJ, ANES/STEROID, TRANSFORAMINAL, LUMB/SACR, SNGL LEVL: ICD-10-PCS | Mod: LT,,, | Performed by: ANESTHESIOLOGY

## 2022-10-07 PROCEDURE — 25500020 PHARM REV CODE 255: Mod: PO | Performed by: ANESTHESIOLOGY

## 2022-10-07 RX ORDER — METHYLPREDNISOLONE ACETATE 40 MG/ML
INJECTION, SUSPENSION INTRA-ARTICULAR; INTRALESIONAL; INTRAMUSCULAR; SOFT TISSUE
Status: DISCONTINUED | OUTPATIENT
Start: 2022-10-07 | End: 2022-10-07 | Stop reason: HOSPADM

## 2022-10-07 RX ORDER — ALPRAZOLAM 0.5 MG/1
1 TABLET, ORALLY DISINTEGRATING ORAL ONCE AS NEEDED
Status: COMPLETED | OUTPATIENT
Start: 2022-10-07 | End: 2022-10-07

## 2022-10-07 RX ORDER — BUPIVACAINE HYDROCHLORIDE 2.5 MG/ML
INJECTION, SOLUTION EPIDURAL; INFILTRATION; INTRACAUDAL
Status: DISCONTINUED | OUTPATIENT
Start: 2022-10-07 | End: 2022-10-07 | Stop reason: HOSPADM

## 2022-10-07 RX ADMIN — ALPRAZOLAM 1 MG: 0.5 TABLET, ORALLY DISINTEGRATING ORAL at 12:10

## 2022-10-07 NOTE — OP NOTE
PROCEDURE DATE: 10/7/2022    PROCEDURE: Left L4/5 transforaminal epidural steroid injection under fluoroscopy    DIAGNOSIS: Lumbar  Radiculopathy    Post op diagnosis: Same    PHYSICIAN: Chacho Joyner MD    MEDICATIONS INJECTED:  Methylprednisolone 40mg (1ml) and 1ml 0.25% bupivicaine at each nerve root.     LOCAL ANESTHETIC INJECTED:  Lidocaine 1%. 4 ml per site.    SEDATION MEDICATIONS: none    ESTIMATED BLOOD LOSS:  none    COMPLICATIONS:  none    TECHNIQUE:   A time-out was taken to identify patient and procedure side prior to starting the procedure. The patient was placed in a prone position, prepped and draped in the usual sterile fashion using ChloraPrep and sterile towels.  The area to be injected was determined under fluoroscopic guidance in AP and oblique view.  Local anesthetic was given by raising a wheal and going down to the hub of a 25-gauge 1.5 inch needle.  In oblique view, a 3.5 inch 22-gauge bent-tip spinal needle was introduced towards 6 oclock position of the pedicle of each above named nerve root level.  The needle was walked medially then hinged into the neural foramen and position was confirmed in AP and lateral views.  Omnipaque contrast dye was injected to confirm appropriate placement and that there was no vascular uptake.  After negative aspiration for blood or CSF, the medication was then injected. This was performed at the left L4/5 level(s). The patient tolerated the procedure well.    The patient was monitored after the procedure.  Patient was given post procedure and discharge instructions to follow at home. The patient was discharged in a stable condition.

## 2023-02-02 ENCOUNTER — OFFICE VISIT (OUTPATIENT)
Dept: NEUROSURGERY | Facility: CLINIC | Age: 68
End: 2023-02-02
Payer: MEDICARE

## 2023-02-02 VITALS
WEIGHT: 203 LBS | HEIGHT: 68 IN | HEART RATE: 18 BPM | SYSTOLIC BLOOD PRESSURE: 134 MMHG | BODY MASS INDEX: 30.77 KG/M2 | RESPIRATION RATE: 18 BRPM | DIASTOLIC BLOOD PRESSURE: 94 MMHG

## 2023-02-02 DIAGNOSIS — M21.372 FOOT DROP, LEFT: ICD-10-CM

## 2023-02-02 DIAGNOSIS — M51.36 DDD (DEGENERATIVE DISC DISEASE), LUMBAR: ICD-10-CM

## 2023-02-02 DIAGNOSIS — M43.16 SPONDYLOLISTHESIS OF LUMBAR REGION: Primary | ICD-10-CM

## 2023-02-02 PROCEDURE — 99214 PR OFFICE/OUTPT VISIT, EST, LEVL IV, 30-39 MIN: ICD-10-PCS | Mod: S$PBB,,, | Performed by: STUDENT IN AN ORGANIZED HEALTH CARE EDUCATION/TRAINING PROGRAM

## 2023-02-02 PROCEDURE — 99214 OFFICE O/P EST MOD 30 MIN: CPT | Mod: S$PBB,,, | Performed by: STUDENT IN AN ORGANIZED HEALTH CARE EDUCATION/TRAINING PROGRAM

## 2023-02-02 NOTE — PROGRESS NOTES
[facility]  Clinic Progress Note       PCP: Flores Pinon MD    SUBJECTIVE:     Chief Complai    67-year-old female.  She is been managing a left radiculopathy and incomplete foot drop.  She is had treatment with Dr. Joyner epidural steroid injections she would significant improvement in her pain however feels like her footdrop is stable    She noticed some temperature changes and will notice coolness and some numbness in her left foot intermittently.  She feels that when she gets started in the morning she does okay but throughout the day she feels that she notices her foot slapping with some increased weakness.  The remains ambulatory independently and performs her ADLs.  She really denies any significant pain        Interval History 9/1/2022  Patient returns today after completing EMG and receiving TFESI left L4-5. She reports improvement in back pain. She states it provided minimal relief in the leg. She reports no change in weakness.     History of Present Illness 8/18/2022  Astrid Camacho is a 67 y.o. female with HTN, CAD, afib maintained on Xarelto, hypothyroidism, DM who presents for evaluation of leg pain and weakness. Patient reports pulling something in the left low back while she was on vacation at the end of July. She used a restroom with a low toilet and when she went to stand she felt pain in the left low back/buttock. The following day she had severe pain in the left leg. The pain is present mostly in the left distal leg, in the distal L5 distribution. She will also experience pain in the buttocks. She saw a chiropractor who is also a NP while out of town who did massage and prescribed a steroid pack. She reports no improvement. She sought evaluation in at the ED upon return and did not improve with pain medication. She was evaluated by ortho and referred to our office after completing MRI. She notes onset of weakness in the left foot 1 week ago. She  also reports numbness in the foot and feeling as though her foot is extremely cold. The pain is constant. It keeps her up at night.     Pertinent and recent history, provider evaluations, imaging and data reviewed in [facility]        Past Medical History:   Diagnosis Date    Atrial fibrillation 08/11/2012    Bell's palsy     Coronary artery disease     HTN (hypertension) 05/08/2002    from [facility]     Hyperglycemia     Nerve palsy     3rd     Situational anxiety 10/29/2010    from [facility]     Situational depression 10/29/2010    from [facility]      Past Surgical History:   Procedure Laterality Date    APPENDECTOMY      CORONARY STENT PLACEMENT  2005    1 stent from [facility]    HYSTERECTOMY      from [facility] record    THYROIDECTOMY  10/13/2019    TRANSFORAMINAL EPIDURAL INJECTION OF STEROID Left 8/26/2022    Procedure: Injection,steroid,epidural,transforaminal approach L4/5;  Surgeon: Chacho Joyner MD;  Location: [facility] OR;  Service: Pain Management;  Laterality: Left;    TRANSFORAMINAL EPIDURAL INJECTION OF STEROID Left 10/7/2022    Procedure: Injection,steroid,epidural,transforaminal approach L4/5;  Surgeon: Chacho Joyner MD;  Location: [facility] OR;  Service: Pain Management;  Laterality: Left;     Family History   Problem Relation Age of Onset    Hypertension Mother     Pacemaker/defibrilator Mother     Heart attack Father     Prostate cancer Father      Social History     Tobacco Use    Smoking status: Never    Smokeless tobacco: Never   Substance Use Topics    Alcohol use: Yes     Alcohol/week: 1.0 standard drink     Types: 1 Glasses of wine per week     Comment: very little     Drug use: Not Currently      Review of patient's allergies indicates:   Allergen Reactions    Effexor [venlafaxine]     Codeine Nausea And Vomiting       Current Outpatient Medications:     ALPRAZolam (XANAX) 1 MG tablet, Take 1 mg by mouth as needed. , Disp: , Rfl:     amLODIPine (NORVASC) 5 MG tablet, TAKE 1 TABLET BY MOUTH ONCE DAILY AS DIRECTED FOR 30  "DAYS, Disp: , Rfl:     calcitRIOL (ROCALTROL) 0.5 MCG Cap, Take 0.5 mcg by mouth once daily. , Disp: , Rfl:     furosemide (LASIX) 20 MG tablet, Take 20 mg by mouth as needed., Disp: , Rfl:     gabapentin (NEURONTIN) 300 MG capsule, Take 1 cap PO QHS x1wk, then 1 cap PO BID x1wk, then 1 cap PO TID, Disp: 90 capsule, Rfl: 1    ibuprofen (ADVIL,MOTRIN) 200 MG tablet, Take 200 mg by mouth every 6 (six) hours as needed for Pain., Disp: , Rfl:     levothyroxine (SYNTHROID) 150 MCG tablet, Take 150 mcg by mouth Daily. , Disp: , Rfl:     losartan-hydrochlorothiazide 100-25 mg (HYZAAR) 100-25 mg per tablet, Take 1 tablet by mouth once daily., Disp: , Rfl: 3    metFORMIN (GLUCOPHAGE-XR) 500 MG ER 24hr tablet, metformin  mg tablet,extended release 24 hr  Take 1 tablet twice a day by oral route as directed for 90 days., Disp: , Rfl:     sotalol (BETAPACE) 120 MG Tab, Take 120 mg by mouth once daily., Disp: , Rfl:     XARELTO 20 mg Tab, TAKE 1 TABLET EVERY DAY WITH FOOD ONCE A DAY ORALLY 90 DAYS, Disp: 90 tablet, Rfl: 3    Review of Systems:   Constitutional: no fever, chills or night sweats. No changes in weight   Eyes: no visual changes   ENT: no nasal congestion or sore throat   Respiratory: no cough or shortness of breath   Cardiovascular: no chest pain or palpitations   Gastrointestinal: no nausea or vomiting   Genitourinary: no hematuria or dysuria   Integument/Breast: no rash or pruritis   Hematologic/Lymphatic: no easy bruising or lymphadenopathy   Musculoskeletal: +back pain   Neurological: no seizures or tremors +weakness   Behavioral/Psych: no auditory or visual hallucinations   Endocrine: no heat or cold intolerance         OBJECTIVE:     Vital Signs (Most Recent):  Resp: 18 (02/02/23 1010)  Estimated body mass index is 30.87 kg/m² as calculated from the following:    Height as of this encounter: 5' 8" (1.727 m).    Weight as of this encounter: 92.1 kg (203 lb).    Physical Exam:   General: well developed, " well nourished, no distress.   Neurologic: Alert and oriented. Thought content appropriate. GCS 15.   Language: No aphasia  Speech: No dysarthria  Head: normocephalic, atraumatic  Eyes: EOMI.  Neck: trachea midline, no JVD   Cardiovascular: no LE edema  Pulmonary: normal respirations, no signs of respiratory distress  Abdomen: non-distended  Sensory: allodynia left distal lateral leg   Skin: Skin is warm, dry and intact.    Motor Strength: Moves all extremities spontaneously with good tone. No abnormal movements seen.     Strength  Deltoids Triceps Biceps Wrist Extension Wrist Flexion Hand  Interossei    Upper: R 5/5 5/5 5/5 5/5 5/5 5/5 5/5     L 5/5 5/5 5/5 5/5 5/5 5/5 5/5      Iliopsoas Quadriceps Knee  Flexion Tibialis  anterior Gastro- cnemius EHL Foot Inversion Foot Eversion   Lower: R 5/5 5/5 5/5 5/5 5/5 5/5      L 5/5 5/5 5/5 4/5 5/5 4/5 4/5 5/5     DTR's: 1+  Gait: antalgic     Cannot stand on left heel. Able to stand on toes           Diagnostic Results:  I have independently reviewed the following imaging:  MRI: Reviewed  r    L4-5 , low grade 1 spondy normal disc height, facet arthropathy without central stenosis  Moderate lateral recess; appears to be a samll 6x4mm sequestered fragment in the proximal foramin  Right L5-S1 disc osteophyte with PL stenosis  FINDINGS:  Minimal lateral curvature convex to the right.  No fracture.  Vertebral body height is maintained.  L4-5 demonstrates mild moderate disc space narrowing, anterolisthesis in the flexion position measuring 5 mm, and in the extension position measuring approximately 4 mm..  L5-S1 demonstrates mild disc space narrowing.  Suspected facet arthrosis lower lumbar spine.  Moderate atherosclerotic calcification.     Impression:     No acute findings.  Slightly dynamic anterolisthesis L4-5.  Spondylosis as above..        Electronically signed by: Maria Esther Garsia MD  Date:                                            08/20/2022  Time:                                            10:34    ASSESSMENT/PLAN:     Spondylolisthesis of lumbar region    DDD (degenerative disc disease), lumbar    Foot drop, left          Astrid Camacho is a 67 y.o. female  With subacute left leg pain and incomplete dorsiflexion weakness, pain is severe  She is ambulating independent with subtle but true weakness  Hx of diabetes  ddx includes Lumbar radiculopathy vs peripheral neuropathy  Lumbar MRI without significant mass effect   L4-5 and 5-1 spondyslosis  However, though not reported by radiology ,   Her MRI is suspicious for a small left L4-5 sequestered disc fragment 4x6 mm  In the proximal foramin, not clearly correlating with L5 compression or large but on left just near the axilla of L4 in combo with 4-5 lateral recess  If so likely very significant inflammatory component as much as mass effect      Options:  1.  Left 4-5 LEELA diagnostic/theraputic , +/- EMG/NCS r/o periphiperal neur    If no lasting - has been counseled on sx  Needs dynamic xrays    2. Surgery reviewed , facetectomy or interbody for decompression, needing fusion    She wants to try LEELA   Hx Afib /cardiac needs to be off xarelto; stent over a decade ago no new symptoms    Overall better but still symptomatic with a dynamic component and spondylolisthesis  Does not want surgery and verbalized understanding of the risk of conservative tx -progression, permanent deficit ect  L5 radiculopathy , feels like shes doing better and wants to continue PT, pain mg      Will let us know response of if new s/s      The diagnosis, goals, limitations, risks and benefits of surgery and alternative treatment options where discussed at length (pros/cons). All questions/concerns were addressed. The patient has verbalized a good understanding of the diagnosis, the potential procedure, anticipated post-operative course, overall expectations, and risks including but not limited to: spinal cord or nerve root injury/paralysis, death, nerve  injury leading to pain or neurological deficit, csf leak, vascular injury or serious bleeding/need for blood product transfusion/stroke, wrong level surgery, chronic pain/failure to improve or worsening of symptoms, infection, need for further surgery at the same or different levels; medical (eg Heart attack, blood clot, infection) and anesthetic complications.        Assessment plan today    67-year-old female with 6 months of a stable incomplete left footdrop.  She has improved pain and sensory symptoms she has nice any significant limitations.  However she does note that throughout the day she notices some slapping and weakness in her left foot  Overall she is better over the last 6 months though this does bother her.  We had a long discussion of her findings with left-sided foraminal stenosis and a grade 1 spondylolisthesis at L4-5 which she is likely symptomatic from.  She does not want surgery the risk or recovery.  And were quite clear on the discussion that 6 months of a stable incomplete foot drop may well not improve with neural decompression and stabilization  She like to continue to monitor she like to work with physical therapy on her gait and her walking for the evaluation and will continue work with pain management.  Should any changes occur pain sensory or motor we discussed clearly she will let us know otherwise she like to continue with her conservative care.                Patient verbalized understanding of plan. Encouraged to call with any questions or concerns.     This note was partially dictated using voice recognition software, so please excuse any errors that were not corrected.

## 2023-02-14 ENCOUNTER — CLINICAL SUPPORT (OUTPATIENT)
Dept: REHABILITATION | Facility: HOSPITAL | Age: 68
End: 2023-02-14
Attending: STUDENT IN AN ORGANIZED HEALTH CARE EDUCATION/TRAINING PROGRAM
Payer: MEDICARE

## 2023-02-14 DIAGNOSIS — M43.16 SPONDYLOLISTHESIS OF LUMBAR REGION: ICD-10-CM

## 2023-02-14 DIAGNOSIS — R29.898 LEFT LEG WEAKNESS: ICD-10-CM

## 2023-02-14 DIAGNOSIS — M51.36 DDD (DEGENERATIVE DISC DISEASE), LUMBAR: ICD-10-CM

## 2023-02-14 DIAGNOSIS — M21.372 FOOT DROP, LEFT: ICD-10-CM

## 2023-02-14 DIAGNOSIS — R26.9 GAIT ABNORMALITY: ICD-10-CM

## 2023-02-14 PROCEDURE — 97161 PT EVAL LOW COMPLEX 20 MIN: CPT | Mod: PO

## 2023-02-14 PROCEDURE — 97112 NEUROMUSCULAR REEDUCATION: CPT | Mod: PO

## 2023-02-14 NOTE — PLAN OF CARE
[facility]   Physical Therapy Initial Evaluation     Date: 2/14/2023   Name: Astrid Camacho  [facility] Number: [id number]    Therapy Diagnosis:   Encounter Diagnoses   Name Primary?    Spondylolisthesis of lumbar region     DDD (degenerative disc disease), lumbar     Foot drop, left     Gait abnormality     Left leg weakness      Physician: Leonard Frey MD    Physician Orders: PT Eval and Treat  Medical Diagnosis from Referral:   M43.16 (ICD-10-CM) - Spondylolisthesis of lumbar region   M51.36 (ICD-10-CM) - DDD (degenerative disc disease), lumbar   M21.372 (ICD-10-CM) - Foot drop, left     Evaluation Date: 2/14/2023  Authorization Period Expiration: 02/02/2024  Plan of Care Expiration: 04/14/2023  Progress Note Due: 03/14/2023  Visit # / Visits authorized: 1/1   FOTO: Completed 02/14/2023    Precautions: Standard and Fall    Time In: 1101  Time Out: 1200  Total Appointment Time (timed & untimed codes): 55 minutes      SUBJECTIVE   Date of onset: July 2022    History of current condition - Carolyne reports:   Sudden onset of low back and left leg pain while on vacation (standing up from low toilet). No significant relief from pain medication or oral steroids. Lumbar LEELA 08/26/2022 and 10/07/2022 with good relief of pain, but no significant change in foot drop. Notes gait and balance changes. Altered temperature and increased fatigue at the end of the day.    She feels fairly normal in the morning and after a hot shower she feels great. Starts dragging the leg more and more throughout the day and then her back starts to bother her. Pain is described as aching.     Just retired (previous desk work). Too cold right now so not doing much outside. Wants to be able to walk and hike through the mountains.    Prior Therapy: None  Social History: Loves with spouse, single story home, step-in shower  Occupation: Retired  Prior Level of Function: No significant functional limitations  Current Level of  Function: Limited walking tolerance, altered gait and balance, more time required for ADLs    Pain:  Current 2/10, worst 5/10, best 0/10   Location: bilateral back   Description: Aching and Dull  Aggravating Factors: Walking and Night Time  Easing Factors: pain medication, hot bath, and rest    Red Flag Screening:   Cough  Sneeze  Strain: (--)  Bladder/ bowel: (--)  Falls: (+) mechanical stepping over loose tree limbs  Night pain: (--)  Unexplained weight loss: (--)  General health: Good    Patients goals: Improve gait and balance. Be able to hike in the mountains.     Imaging: See [facility]    Medical History:   Past Medical History:   Diagnosis Date    Atrial fibrillation 08/11/2012    Bell's palsy     Coronary artery disease     HTN (hypertension) 05/08/2002    from [facility]     Hyperglycemia     Nerve palsy     3rd     Situational anxiety 10/29/2010    from [facility]     Situational depression 10/29/2010    from [facility]        Surgical History:   Astrid Camacho  has a past surgical history that includes Coronary stent placement (2005); Hysterectomy; Appendectomy; Thyroidectomy (10/13/2019); Transforaminal epidural injection of steroid (Left, 8/26/2022); and Transforaminal epidural injection of steroid (Left, 10/7/2022).    Medications:   Astrid has a current medication list which includes the following prescription(s): alprazolam, amlodipine, calcitriol, furosemide, gabapentin, ibuprofen, levothyroxine, losartan-hydrochlorothiazide 100-25 mg, metformin, sotalol, and xarelto.    Allergies:   Review of patient's allergies indicates:   Allergen Reactions    Effexor [venlafaxine]     Codeine Nausea And Vomiting          OBJECTIVE     Compensated Trendelenburg on left. Flexed posture with initial WB on left.     Fatigues with repeated muscle testing on left lower extremity (worse with DF)    Functional Screen:     SFMA FN: functional, nonpainful. FP: functional, painful. DP: dysfunctional, painful. DN: dysfunctional, nonpainful.    multi-segmental flexion  Dysfunctional, non-painful Flat lumbar, reaching to mid shin   multi-segmental extension Dysfunctional, non-painful limited anterior hip shift   multi-segmental rotation  R: Dysfunctional, non-painful 25% limited  L: Dysfunctional, non-painful 25% limited   SLS R: Dysfunctional, non-painful ~ 5 sec with trunk lean  L: Dysfunctional, non-painful, <3 sec and LOB   Arms Down Deep Squat Dysfunctional, non-painful right-sided weight shift     Myotome/Motor Control Testing:  Myotome Peripheral Right Left   L2 (Hip Flexion) Segmental L1, L2 negative positive     L3 (Hip IR) Obturator negative   positive     L3 (Knee Extension) Femoral negative positive   L4 (Ankle DF) Deep Fibular  negative   positive     L5 (EHL) Deep Fibular negative positive   L5 (Hip ABduction) Superior Gluteal  negative   positive     S1 (Knee Flexion) Tibial negative   positive     S2 (Hip Extension) Inferior Gluteal negative   positive       Hip Range of Motion (deg):    Right Left   Flexion    Extension <5 <5   Internal Rotation WNL WNL   External Rotation WNL WNL       Knee Range of Motion (deg):    Right Left   Flexion WNL   WNL   Extension WNL   WNL       Ankle Range of Motion (deg):    Right Left   Dorsi Flexion WNL   WNL   1st MTP Extension WNL   WNL     Lower Extremity Strength  (R) LE  (L) LE    Hip flexion: 4+/5 Hip flexion: 4-/5      Hip extension: 4+/5 Hip extension: 4-/5   Hip Abduction: 4+/5    Hip abduction: 4-/5      Hip ER 4+/5    Hip ER 4/5      Hip IR 4+/5    Hip IR 4/5      Knee flexion: 4+/5    Knee flexion: 4/5      Knee extension: 5/5    Knee extension: 4+/5      Ankle DF: 5/5    Ankle DF: 4/5      Ankle PF: 4+/5    Ankle PF: 4/5      Ankle INV: 5/5    Ankle INV: 4+/5      Ankle EV: 5/5    Ankle EV: 4+/5      FHL: 5/5    FHL: 4+/5      Posterior Tib: 5/5    Posterior Tib: 4+/5        Special Tests:  Hip Scour (-)   CLARE (-)   Thigh thrust (-)   Sacral Thrust (-)   SIJ Comp/Dist (-)   SLR (-)    Slump/Nerve tension (+) but near end range     Muscle Length:   Right Left   Quads (Ely test):  (-) deg (-) deg   Hamstrings (90/90): (10) deg (15) deg   Hip flexors (Umang test): (+) psoas, (-) rectus femoris (+) psoas, (-) rectus femoris     Joint Mobility:  Thoracic: HYPOmobile  Lumbar: WNL    Palpation:  tender to palpation left quadratus lumborum, lumbar paravertebral muscles  Non-tender to left lower extremity   Sensation: Intact to light touch     Limitation/Restriction for FOTO Lower leg Survey    Therapist reviewed FOTO scores for Astrid Camacho on 2/14/2023.   FOTO documents entered into [employer] - see Media section.    Limitation Score: 59%         TREATMENT     Total Treatment time (time-based codes) separate from Evaluation: 30 minutes     Carolyne received the treatments listed below:      Carolyne participated in neuromuscular re-education activities to improve: Balance, Coordination, and motor control for 30 minutes. The following activities were included:    Cues to exhale on concentric to increase mindfulness and better support the back    SLR with ankle DF isometrics 2x5 bilaterally   Articulating bridge 2x5  Bent knee fallouts, unilateral x10  Sciatic/tibial nerve glides x2 min on left  Clamshells x10 bilaterally   Seated ankle DF/PF 2x30 sec (full range of motion with 1-2 sec hold)  Seated supination, plantarflexion (full range of motion with 1-2 sec hold)  Semi-tandem balance x60 sec bilaterally   - head turns x30 sec bilaterally     PATIENT EDUCATION AND HOME EXERCISES     Education provided:   - Presentation, prognosis, plan of care, HEP  - Nerves like space, movement, and blood supply. Feed them throughout the day.    Written Home Exercises Provided: yes. Exercises were reviewed and Carolyne was able to demonstrate them prior to the end of the session.  Carolyne demonstrated good  understanding of the education provided. See EMR under Patient Instructions for exercises provided during therapy  sessions.    Images copyright of [email or web address] or [email or web address]    ASSESSMENT     Astrid is a 67 y.o. female referred to outpatient Physical Therapy with a medical diagnosis of left foot drop. Patient presents with gross motor deficits in the left lower extremity which is affecting ADLs, gait, balance, and leading to compensations that are likely contributing to her low back pain. She would benefit from skilled PT to maximize functional mobility and reduce fall risk.    Patient prognosis is Excellent.     Patient will benefit from skilled outpatient Physical Therapy to address the deficits stated above and in the chart below, provide patient /family education, and to maximize patient's level of independence.     Plan of care discussed with patient: Yes  Patient's spiritual, cultural and educational needs considered and patient is agreeable to the plan of care and goals as stated below:     Anticipated Barriers for therapy: None    Medical Necessity is demonstrated by the following  History  Co-morbidities and personal factors that may impact the plan of care Co-morbidities:   HTN    Personal Factors:   no deficits     low   Examination  Body Structures and Functions, activity limitations and participation restrictions that may impact the plan of care Body Regions:   back  lower extremities    Body Systems:    strength  balance  gait  transfers  motor control    Participation Restrictions:   None    Activity limitations:   Learning and applying knowledge  no deficits    General Tasks and Commands  no deficits    Communication  no deficits    Mobility  walking    Self care  dressing    Domestic Life  shopping  cooking  doing house work (cleaning house, washing dishes, laundry)  assisting others    Interactions/Relationships  no deficits    Life Areas  no deficits    Community and Social Life  community life  recreation and leisure         moderate   Clinical Presentation evolving clinical presentation with  changing clinical characteristics moderate   Decision Making/ Complexity Score: low     Goals:  Short Term Goals: 4 weeks   Patient will be independent with HEP for symptom management  Patient will increase strength of LLE by at least 1/2 grade via MMT testing to allow for improved performance of ADLs and daily functional tasks  Patient will be able to walk >10 min with grossly symmetrical gait mechanics  Patient will demonstrate tandem stance >20 sec bilaterally to suggest improved static balance  Patient will report >25% improvement in late day fatigue    Long Term Goals: 8 weeks   Patient will be independent with progressive HEP for continued strengthening to support return to previous level of function  Patient will have grossly symmetrical, pain-free range of motion for improved functional mobility  Patient will increase strength of left LE by at least 1 grade via MMT testing to allow for improved performance of ADLs and daily functional tasks  Patient will be able to walk >20 min  Patient will achieve <30% limitation as measured by the FOTO to demonstrate decreased functional disability    PLAN   Plan of care Certification: 2/14/2023 to 04/14/2023.    Outpatient Physical Therapy 1 times weekly for 8 weeks to include the following interventions: Electrical Stimulation Dry needling/E-stim, Gait Training, Manual Therapy, Moist Heat/ Ice, Neuromuscular Re-ed, Patient Education, Self Care, Therapeutic Activities, and Therapeutic Exercise.     Ravindra Aviles, PT      I CERTIFY THE NEED FOR THESE SERVICES FURNISHED UNDER THIS PLAN OF TREATMENT AND WHILE UNDER MY CARE   Physician's comments:     Physician's Signature: ___________________________________________________

## 2023-02-22 ENCOUNTER — CLINICAL SUPPORT (OUTPATIENT)
Dept: REHABILITATION | Facility: HOSPITAL | Age: 68
End: 2023-02-22
Attending: STUDENT IN AN ORGANIZED HEALTH CARE EDUCATION/TRAINING PROGRAM
Payer: MEDICARE

## 2023-02-22 DIAGNOSIS — R26.9 GAIT ABNORMALITY: ICD-10-CM

## 2023-02-22 DIAGNOSIS — R29.898 LEFT LEG WEAKNESS: ICD-10-CM

## 2023-02-22 DIAGNOSIS — M21.372 FOOT DROP, LEFT: Primary | ICD-10-CM

## 2023-02-22 PROCEDURE — 97140 MANUAL THERAPY 1/> REGIONS: CPT | Mod: PO

## 2023-02-22 PROCEDURE — 97112 NEUROMUSCULAR REEDUCATION: CPT | Mod: PO

## 2023-02-22 NOTE — PROGRESS NOTES
[facility]   Physical Therapy Treatment Note     Name: Astrid Camacho  [facility] Number: [id number]    Therapy Diagnosis:   Encounter Diagnoses   Name Primary?    Foot drop, left Yes    Gait abnormality     Left leg weakness      Physician: Leonard Frey MD    Visit Date: 2/22/2023    Physician Orders: PT Eval and Treat  Medical Diagnosis from Referral:   M43.16 (ICD-10-CM) - Spondylolisthesis of lumbar region   M51.36 (ICD-10-CM) - DDD (degenerative disc disease), lumbar   M21.372 (ICD-10-CM) - Foot drop, left      Evaluation Date: 2/14/2023  Authorization Period Expiration: 02/02/2024  Plan of Care Expiration: 04/14/2023  Progress Note Due: 03/14/2023  Visit # / Visits authorized: 1/1   FOTO: Completed 02/14/2023     Precautions: Standard and Fall    Time In: 1200  Time Out: 1300  Total Billable Time: 55 minutes      SUBJECTIVE     Pt reports: Generally sore from the exercises. Difficult to do the bridges on her bed. Feels about the same as her initial evaluation.    She was compliant with home exercise program.  Response to previous treatment: Soreness  Functional change: Too soon to assess    Pain: 2/10  Location: bilateral back, left lower extremity      OBJECTIVE     Objective Measures updated at progress report unless specified.   SFMA FN: functional, nonpainful. FP: functional, painful. DP: dysfunctional, painful. DN: dysfunctional, nonpainful.   multi-segmental flexion  Dysfunctional, non-painful Flat lumbar, reaching to mid shin   multi-segmental extension Dysfunctional, non-painful limited anterior hip shift   multi-segmental rotation  R: Dysfunctional, non-painful 25% limited  L: Dysfunctional, non-painful 25% limited   SLS R: Dysfunctional, non-painful ~ 5 sec with trunk lean  L: Dysfunctional, non-painful, <3 sec and LOB   Arms Down Deep Squat        TREATMENT     Total Treatment time (time-based codes) separate from Evaluation: 55 minutes     Carolyne received the treatments  listed below:      Carolyne participated in neuromuscular re-education activities to improve: Balance, Coordination, and motor control for 40 minutes. The following activities were included:     Cues to exhale on concentric to increase mindfulness and better support the back  Clamshells x15 bilaterally   SLR with ankle DF isometrics 2x8 bilaterally   - Sciatic/tibial nerve glides 2x2 min on left (between SLR sets)  Bent knee fallouts, unilateral x15 with red Theraband   - bilateral x15 with red TB    Supine Ankle DF with 2# weight at mid foot (heel hanging off edge of table) x20 total    Standing ankle DF/hip flexion x10 (full range of motion with 1-2 sec hold)  Seated supination, plantarflexion (full range of motion with 1-2 sec hold)  Semi-tandem balance x60 sec bilaterally   - head turns x30 sec bilaterally     Sit-to-stand 2x5  Standing hip extension, abduction 2x5 bilaterally with red Theraband     Carolyne received the following manual therapy techniques: Myofacial release and Soft tissue Mobilization were applied to the: lumbar spine, hips for 15 minutes, including:  Soft tissue mobilization, cross friction massage lumbar paravertebral muscles, gluteals, lateral quads, lateral hamstrings    PATIENT EDUCATION AND HOME EXERCISES     Home Exercises Provided and Patient Education Provided     Education provided:   - Presentation, prognosis, plan of care, HEP     Written Home Exercises Provided: yes. Exercises were reviewed and Carolyne was able to demonstrate them prior to the end of the session.  Carolyne demonstrated good  understanding of the education provided. See EMR under Patient Instructions for exercises provided during therapy sessions    ASSESSMENT     Carolyne demonstrated good response to manual therapy and performance of therapeutic exercise at today's session. She was able to perform all exercises with good fatigue to target muscles without increase lumbar or LLE symptoms. She would benefit from increased  lumbar stability to improve mobility and activity tolerance.    Carolyne Is progressing well towards her goals.   Pt prognosis is Good.     Pt will continue to benefit from skilled outpatient physical therapy to address the deficits listed in the problem list box on initial evaluation, provide pt/family education and to maximize pt's level of independence in the home and community environment.     Pt's spiritual, cultural and educational needs considered and pt agreeable to plan of care and goals.    Goals:  Short Term Goals: 4 weeks   Patient will be independent with HEP for symptom management  Patient will increase strength of LLE by at least 1/2 grade via MMT testing to allow for improved performance of ADLs and daily functional tasks  Patient will be able to walk >10 min with grossly symmetrical gait mechanics  Patient will demonstrate tandem stance >20 sec bilaterally to suggest improved static balance  Patient will report >25% improvement in late day fatigue     Long Term Goals: 8 weeks   Patient will be independent with progressive HEP for continued strengthening to support return to previous level of function  Patient will have grossly symmetrical, pain-free range of motion for improved functional mobility  Patient will increase strength of left LE by at least 1 grade via MMT testing to allow for improved performance of ADLs and daily functional tasks  Patient will be able to walk >20 min  Patient will achieve <30% limitation as measured by the FOTO to demonstrate decreased functional disability    PLAN     E-stim for foot drop as indicated (Algerian, NMES)  Ankle DF isometrics seated, standing  Low intensity steady state cardio (stepper, bike, treadmill)      Ravindra Aviles, PT

## 2023-02-28 ENCOUNTER — CLINICAL SUPPORT (OUTPATIENT)
Dept: REHABILITATION | Facility: HOSPITAL | Age: 68
End: 2023-02-28
Attending: STUDENT IN AN ORGANIZED HEALTH CARE EDUCATION/TRAINING PROGRAM
Payer: MEDICARE

## 2023-02-28 DIAGNOSIS — R29.898 LEFT LEG WEAKNESS: ICD-10-CM

## 2023-02-28 DIAGNOSIS — M21.372 FOOT DROP, LEFT: Primary | ICD-10-CM

## 2023-02-28 DIAGNOSIS — R26.9 GAIT ABNORMALITY: ICD-10-CM

## 2023-02-28 PROCEDURE — 97110 THERAPEUTIC EXERCISES: CPT | Mod: PO,CQ

## 2023-02-28 PROCEDURE — 97112 NEUROMUSCULAR REEDUCATION: CPT | Mod: PO,CQ

## 2023-02-28 NOTE — PROGRESS NOTES
" [facility]   Physical Therapy Treatment Note     Name: Astrid Camacho  Clinic Number: [id number]    Therapy Diagnosis:   Encounter Diagnoses   Name Primary?    Foot drop, left Yes    Gait abnormality     Left leg weakness        Physician: Leonard Frey MD    Visit Date: 2/28/2023    Physician Orders: PT Eval and Treat  Medical Diagnosis from Referral:   M43.16 (ICD-10-CM) - Spondylolisthesis of lumbar region   M51.36 (ICD-10-CM) - DDD (degenerative disc disease), lumbar   M21.372 (ICD-10-CM) - Foot drop, left      Evaluation Date: 2/14/2023  Authorization Period Expiration: 02/02/2024  Plan of Care Expiration: 04/14/2023  Progress Note Due: 03/14/2023  Visit # / Visits authorized: 2/1   FOTO: Completed 02/14/2023     Precautions: Standard and Fall    Time In: 9:17  Time Out: 10:30  Total Billable Time: 55 minutes      SUBJECTIVE     Pt reports: Her LB feels like a "toothache", not a lot of pain. Her L foot is still weak.     She was compliant with home exercise program.  Response to previous treatment: Soreness  Functional change: Too soon to assess    Pain: 0/10  Location: bilateral back, left lower extremity      OBJECTIVE     Objective Measures updated at progress report unless specified.   SFMA FN: functional, nonpainful. FP: functional, painful. DP: dysfunctional, painful. DN: dysfunctional, nonpainful.   multi-segmental flexion  Dysfunctional, non-painful Flat lumbar, reaching to mid shin   multi-segmental extension Dysfunctional, non-painful limited anterior hip shift   multi-segmental rotation  R: Dysfunctional, non-painful 25% limited  L: Dysfunctional, non-painful 25% limited   SLS R: Dysfunctional, non-painful ~ 5 sec with trunk lean  L: Dysfunctional, non-painful, <3 sec and LOB   Arms Down Deep Squat        TREATMENT     Total Treatment time (time-based codes) separate from Evaluation: 43 minutes     Carolyne received the treatments listed below:      Carolyne participated in " neuromuscular re-education activities to improve: Balance, Coordination, and motor control for 43 minutes. The following activities were included:     Cues to exhale on concentric to increase mindfulness and better support the back  Clamshells 2 x 20 bilaterally   SLR with ankle DF isometrics 2x8 bilaterally   - Sciatic/tibial nerve glides 2x2 min on left (between SLR sets)  Bent knee fallouts, unilateral x15 with red Theraband   - bilateral x20 with red TB        Standing ankle DF/hip flexion x10 (full range of motion with 1-2 sec hold)  Seated supination, plantarflexion x 10 (full range of motion with 1-2 sec hold)  Semi-tandem balance x60 sec bilaterally   - head turns x30 sec bilaterally     Sit-to-stand 2x10  Standing hip extension, abduction 10x bilaterally with red Theraband     Carolyne received the following manual therapy techniques: Myofacial release and Soft tissue Mobilization were applied to the: lumbar spine, hips for 00 minutes, including:  Soft tissue mobilization, cross friction massage lumbar paravertebral muscles, gluteals, lateral quads, lateral hamstrings    PATIENT EDUCATION AND HOME EXERCISES     Home Exercises Provided and Patient Education Provided     Education provided:   - Presentation, prognosis, plan of care, HEP     Written Home Exercises Provided: yes. Exercises were reviewed and Carolyne was able to demonstrate them prior to the end of the session.  Carolyne demonstrated good  understanding of the education provided. See EMR under Patient Instructions for exercises provided during therapy sessions    ASSESSMENT     Carolyne demonstrated good response to performance of therapeutic exercise without any increased pain. She was able to DF L foot better post session, but does need to concentrate more when walking. She would benefit from increased lumbar stability to improve mobility and activity tolerance.    Carolyne Is progressing well towards her goals.   Pt prognosis is Good.     Pt will  continue to benefit from skilled outpatient physical therapy to address the deficits listed in the problem list box on initial evaluation, provide pt/family education and to maximize pt's level of independence in the home and community environment.     Pt's spiritual, cultural and educational needs considered and pt agreeable to plan of care and goals.    Goals:  Short Term Goals: 4 weeks   Patient will be independent with HEP for symptom management  Patient will increase strength of LLE by at least 1/2 grade via MMT testing to allow for improved performance of ADLs and daily functional tasks  Patient will be able to walk >10 min with grossly symmetrical gait mechanics  Patient will demonstrate tandem stance >20 sec bilaterally to suggest improved static balance  Patient will report >25% improvement in late day fatigue     Long Term Goals: 8 weeks   Patient will be independent with progressive HEP for continued strengthening to support return to previous level of function  Patient will have grossly symmetrical, pain-free range of motion for improved functional mobility  Patient will increase strength of left LE by at least 1 grade via MMT testing to allow for improved performance of ADLs and daily functional tasks  Patient will be able to walk >20 min  Patient will achieve <30% limitation as measured by the FOTO to demonstrate decreased functional disability    PLAN     E-stim for foot drop as indicated (Mozambican, NMES)  Ankle DF isometrics seated, standing  Low intensity steady state cardio (stepper, bike, treadmill)      Hannah Reyna, BRIANDA

## 2023-03-07 ENCOUNTER — CLINICAL SUPPORT (OUTPATIENT)
Dept: REHABILITATION | Facility: HOSPITAL | Age: 68
End: 2023-03-07
Attending: STUDENT IN AN ORGANIZED HEALTH CARE EDUCATION/TRAINING PROGRAM
Payer: MEDICARE

## 2023-03-07 DIAGNOSIS — M21.372 FOOT DROP, LEFT: Primary | ICD-10-CM

## 2023-03-07 DIAGNOSIS — R29.898 LEFT LEG WEAKNESS: ICD-10-CM

## 2023-03-07 DIAGNOSIS — R26.9 GAIT ABNORMALITY: ICD-10-CM

## 2023-03-07 PROCEDURE — 97110 THERAPEUTIC EXERCISES: CPT | Mod: PO,CQ

## 2023-03-07 NOTE — PROGRESS NOTES
" [facility]   Physical Therapy Treatment Note     Name: Astrid Camacho  Clinic Number: [id number]    Therapy Diagnosis:   Encounter Diagnoses   Name Primary?    Foot drop, left Yes    Gait abnormality     Left leg weakness          Physician: Leonard Frey MD    Visit Date: 3/7/2023    Physician Orders: PT Eval and Treat  Medical Diagnosis from Referral:   M43.16 (ICD-10-CM) - Spondylolisthesis of lumbar region   M51.36 (ICD-10-CM) - DDD (degenerative disc disease), lumbar   M21.372 (ICD-10-CM) - Foot drop, left      Evaluation Date: 2/14/2023  Authorization Period Expiration:   12/29/2023  Plan of Care Expiration: 04/14/2023  Progress Note Due: 03/14/2023  Visit # / Visits authorized: 3/20  FOTO: Completed 02/14/2023     Precautions: Standard and Fall    Time In: 9:15  Time Out: 10:30  Total Billable Time: 45 minutes      SUBJECTIVE     Pt reports: Her LB feels like a "toothache" and her L foot feels stronger, but more in the morning. Towards the end of the day it is weaker.     She was compliant with home exercise program.  Response to previous treatment: Soreness  Functional change: Too soon to assess    Pain: 0/10  Location: bilateral back, left lower extremity      OBJECTIVE     Objective Measures updated at progress report unless specified.   SFMA FN: functional, nonpainful. FP: functional, painful. DP: dysfunctional, painful. DN: dysfunctional, nonpainful.   multi-segmental flexion  Dysfunctional, non-painful Flat lumbar, reaching to mid shin   multi-segmental extension Dysfunctional, non-painful limited anterior hip shift   multi-segmental rotation  R: Dysfunctional, non-painful 25% limited  L: Dysfunctional, non-painful 25% limited   SLS R: Dysfunctional, non-painful ~ 5 sec with trunk lean  L: Dysfunctional, non-painful, <3 sec and LOB   Arms Down Deep Squat        TREATMENT     Total Treatment time (time-based codes) separate from Evaluation: 43 minutes     Carolyne received the " treatments listed below:      Carolyne participated in neuromuscular re-education activities to improve: Balance, Coordination, and motor control for 45 minutes. The following activities were included:     Cues to exhale on concentric to increase mindfulness and better support the back  DKTC 10x  Clamshells 1 x 20 bilaterally w/ R TB  SLR with ankle DF isometrics 2x10 bilaterally   - Sciatic/tibial nerve glides 2x2 min on left (between SLR sets)  Bent knee fallouts, unilateral x20 with red Theraband   - bilateral x20 with red TB        Standing ankle DF/hip flexion x20 B (full range of motion with 1-2 sec hold)  Seated supination, plantarflexion x 10 (full range of motion with 1-2 sec hold)  Seated L foot DF w/ Y TB 2 x 10  Semi-tandem balance x60 sec bilaterally   - head turns x30 sec bilaterally     Sit-to-stand 2x10  Standing hip extension, abduction 10x bilaterally with red Theraband     Carolyne received the following manual therapy techniques: Myofacial release and Soft tissue Mobilization were applied to the: lumbar spine, hips for 00 minutes, including:  Soft tissue mobilization, cross friction massage lumbar paravertebral muscles, gluteals, lateral quads, lateral hamstrings    PATIENT EDUCATION AND HOME EXERCISES     Home Exercises Provided and Patient Education Provided     Education provided:   - Presentation, prognosis, plan of care, HEP     Written Home Exercises Provided: yes. Exercises were reviewed and Carolyne was able to demonstrate them prior to the end of the session.  Carolyne demonstrated good  understanding of the education provided. See EMR under Patient Instructions for exercises provided during therapy sessions    ASSESSMENT   She was able to DF L foot better  with heel strike post session with walking, but does need to concentrate on it more. No increased LBP with session and pt's balance off with head turns 1st trial, but better with second.   She would benefit from increased lumbar stability to  improve mobility and activity tolerance.    Carolyne Is progressing well towards her goals.   Pt prognosis is Good.     Pt will continue to benefit from skilled outpatient physical therapy to address the deficits listed in the problem list box on initial evaluation, provide pt/family education and to maximize pt's level of independence in the home and community environment.     Pt's spiritual, cultural and educational needs considered and pt agreeable to plan of care and goals.    Goals:  Short Term Goals: 4 weeks   Patient will be independent with HEP for symptom management  Patient will increase strength of LLE by at least 1/2 grade via MMT testing to allow for improved performance of ADLs and daily functional tasks  Patient will be able to walk >10 min with grossly symmetrical gait mechanics  Patient will demonstrate tandem stance >20 sec bilaterally to suggest improved static balance  Patient will report >25% improvement in late day fatigue     Long Term Goals: 8 weeks   Patient will be independent with progressive HEP for continued strengthening to support return to previous level of function  Patient will have grossly symmetrical, pain-free range of motion for improved functional mobility  Patient will increase strength of left LE by at least 1 grade via MMT testing to allow for improved performance of ADLs and daily functional tasks  Patient will be able to walk >20 min  Patient will achieve <30% limitation as measured by the FOTO to demonstrate decreased functional disability    PLAN     E-stim for foot drop as indicated (Belizean, NMES)  Ankle DF isometrics seated, standing  Low intensity steady state cardio (stepper, bike, treadmill)      Hannah Reyna, BRIANDA

## 2023-03-14 ENCOUNTER — CLINICAL SUPPORT (OUTPATIENT)
Dept: REHABILITATION | Facility: HOSPITAL | Age: 68
End: 2023-03-14
Attending: STUDENT IN AN ORGANIZED HEALTH CARE EDUCATION/TRAINING PROGRAM
Payer: MEDICARE

## 2023-03-14 DIAGNOSIS — M21.372 FOOT DROP, LEFT: Primary | ICD-10-CM

## 2023-03-14 DIAGNOSIS — R29.898 LEFT LEG WEAKNESS: ICD-10-CM

## 2023-03-14 DIAGNOSIS — R26.9 GAIT ABNORMALITY: ICD-10-CM

## 2023-03-14 PROCEDURE — 97110 THERAPEUTIC EXERCISES: CPT | Mod: PO,CQ

## 2023-03-14 NOTE — PROGRESS NOTES
[facility]   Physical Therapy Treatment Note     Name: Astrid Camacho  [facility] Number: [id number]    Therapy Diagnosis:   Encounter Diagnoses   Name Primary?    Foot drop, left Yes    Gait abnormality     Left leg weakness            Physician: Leonard Frey MD    Visit Date: 3/14/2023    Physician Orders: PT Eval and Treat  Medical Diagnosis from Referral:   M43.16 (ICD-10-CM) - Spondylolisthesis of lumbar region   M51.36 (ICD-10-CM) - DDD (degenerative disc disease), lumbar   M21.372 (ICD-10-CM) - Foot drop, left      Evaluation Date: 2/14/2023  Authorization Period Expiration:   12/29/2023  Plan of Care Expiration: 04/14/2023  Progress Note Due: 03/14/2023  Visit # / Visits authorized: 4/20  FOTO: Completed 02/14/2023     Precautions: Standard and Fall    Time In: 9:15  Time Out: 10:30  Total Billable Time: 45 minutes      SUBJECTIVE     Pt reports: she is not having LBP, she just feels a little discomfort. HEP helps decrease discomfort.    She was compliant with home exercise program.  Response to previous treatment: Soreness  Functional change: Too soon to assess    Pain: 0/10  Location: bilateral back, left lower extremity      OBJECTIVE     Objective Measures updated at progress report unless specified.   SFMA FN: functional, nonpainful. FP: functional, painful. DP: dysfunctional, painful. DN: dysfunctional, nonpainful.   multi-segmental flexion  Dysfunctional, non-painful Flat lumbar, reaching to mid shin   multi-segmental extension Dysfunctional, non-painful limited anterior hip shift   multi-segmental rotation  R: Dysfunctional, non-painful 25% limited  L: Dysfunctional, non-painful 25% limited   SLS R: Dysfunctional, non-painful ~ 5 sec with trunk lean  L: Dysfunctional, non-painful, <3 sec and LOB   Arms Down Deep Squat        TREATMENT     Total Treatment time (time-based codes) separate from Evaluation: 45 minutes     Carolyne received the treatments listed below:   "    Carolyne participated in neuromuscular re-education activities to improve: Balance, Coordination, and motor control for 45 minutes. The following activities were included:     Cues to exhale on concentric to increase mindfulness and better support the back  DKTC 10x w/ G TheraBall 5"  LTR 10x 5"  Clamshells 1 x 20 bilaterally w/ R TB  SLR with ankle DF isometrics 2x10 bilaterally   - Sciatic/tibial nerve glides 2x2 min on left (between SLR sets)  Bent knee fallouts, unilateral x20 with red Theraband   - bilateral x20 with red TB  Bridging w/ articulation 10x      Standing ankle DF/hip flexion x20 B (full range of motion with 1-2 sec hold)  Seated supination, plantarflexion x 10 (full range of motion with 1-2 sec hold)  Seated L foot DF w/ Y TB 2 x 10 (NP)  Semi-tandem balance x60 sec bilaterally   - head turns x30 sec bilaterally x 2 trials    Sit-to-stand 2x10 on blue foam  Standing hip extension, abduction 10x bilaterally with red Theraband     Carolyne received the following manual therapy techniques: Myofacial release and Soft tissue Mobilization were applied to the: lumbar spine, hips for 00 minutes, including:  Soft tissue mobilization, cross friction massage lumbar paravertebral muscles, gluteals, lateral quads, lateral hamstrings    PATIENT EDUCATION AND HOME EXERCISES     Home Exercises Provided and Patient Education Provided     Education provided:   - Presentation, prognosis, plan of care, HEP     Written Home Exercises Provided: yes. Exercises were reviewed and Carolyne was able to demonstrate them prior to the end of the session.  Carolyne demonstrated good  understanding of the education provided. See EMR under Patient Instructions for exercises provided during therapy sessions    ASSESSMENT   She w/o increased LBP with session. Added more stretching and core/pelvic exercises to improve stability w/ lumbar spine. She would benefit from increased lumbar stability to improve mobility and activity " tolerance.    Carolyne Is progressing well towards her goals.   Pt prognosis is Good.     Pt will continue to benefit from skilled outpatient physical therapy to address the deficits listed in the problem list box on initial evaluation, provide pt/family education and to maximize pt's level of independence in the home and community environment.     Pt's spiritual, cultural and educational needs considered and pt agreeable to plan of care and goals.    Goals:  Short Term Goals: 4 weeks   Patient will be independent with HEP for symptom management  Patient will increase strength of LLE by at least 1/2 grade via MMT testing to allow for improved performance of ADLs and daily functional tasks  Patient will be able to walk >10 min with grossly symmetrical gait mechanics  Patient will demonstrate tandem stance >20 sec bilaterally to suggest improved static balance  Patient will report >25% improvement in late day fatigue     Long Term Goals: 8 weeks   Patient will be independent with progressive HEP for continued strengthening to support return to previous level of function  Patient will have grossly symmetrical, pain-free range of motion for improved functional mobility  Patient will increase strength of left LE by at least 1 grade via MMT testing to allow for improved performance of ADLs and daily functional tasks  Patient will be able to walk >20 min  Patient will achieve <30% limitation as measured by the FOTO to demonstrate decreased functional disability    PLAN     E-stim for foot drop as indicated (Chinese, NMES)  Ankle DF isometrics seated, standing  Low intensity steady state cardio (stepper, bike, treadmill)      Hannah Reyna, BRIANDA

## 2023-03-21 ENCOUNTER — CLINICAL SUPPORT (OUTPATIENT)
Dept: REHABILITATION | Facility: HOSPITAL | Age: 68
End: 2023-03-21
Attending: STUDENT IN AN ORGANIZED HEALTH CARE EDUCATION/TRAINING PROGRAM
Payer: MEDICARE

## 2023-03-21 DIAGNOSIS — M21.372 FOOT DROP, LEFT: Primary | ICD-10-CM

## 2023-03-21 DIAGNOSIS — R29.898 LEFT LEG WEAKNESS: ICD-10-CM

## 2023-03-21 DIAGNOSIS — R26.9 GAIT ABNORMALITY: ICD-10-CM

## 2023-03-21 PROCEDURE — 97110 THERAPEUTIC EXERCISES: CPT | Mod: PO,CQ

## 2023-03-21 NOTE — PROGRESS NOTES
[facility]   Physical Therapy Treatment Note     Name: Astrid Camacho  [facility] Number: [id number]    Therapy Diagnosis:   Encounter Diagnoses   Name Primary?    Foot drop, left Yes    Gait abnormality     Left leg weakness        Physician: Leonard Frey MD    Visit Date: 3/21/2023    Physician Orders: PT Eval and Treat  Medical Diagnosis from Referral:   M43.16 (ICD-10-CM) - Spondylolisthesis of lumbar region   M51.36 (ICD-10-CM) - DDD (degenerative disc disease), lumbar   M21.372 (ICD-10-CM) - Foot drop, left      Evaluation Date: 2/14/2023  Authorization Period Expiration:   12/29/2023  Plan of Care Expiration: 04/14/2023  Progress Note Due: 03/14/2023  Visit # / Visits authorized: 4/20  FOTO: Completed 02/14/2023     Precautions: Standard and Fall    Time In: 9:15  Time Out: 10:30  Total Billable Time: 45 minutes      SUBJECTIVE     Pt reports: she is not having LBP, she just feels a little discomfort. HEP helps decrease discomfort.    She was compliant with home exercise program.  Response to previous treatment: Soreness  Functional change: Too soon to assess    Pain: 1/10  Location: bilateral back, left lower extremity      OBJECTIVE     Objective Measures updated at progress report unless specified.   SFMA FN: functional, nonpainful. FP: functional, painful. DP: dysfunctional, painful. DN: dysfunctional, nonpainful.   multi-segmental flexion  Dysfunctional, non-painful Flat lumbar, reaching to mid shin   multi-segmental extension Dysfunctional, non-painful limited anterior hip shift   multi-segmental rotation  R: Dysfunctional, non-painful 25% limited  L: Dysfunctional, non-painful 25% limited   SLS R: Dysfunctional, non-painful ~ 5 sec with trunk lean  L: Dysfunctional, non-painful, <3 sec and LOB   Arms Down Deep Squat        TREATMENT     Total Treatment time (time-based codes) separate from Evaluation: 45 minutes     Carolyne received the treatments listed below:      Carolyne  "participated in neuromuscular re-education activities to improve: Balance, Coordination, and motor control for 45 minutes. The following activities were included:     Cues to exhale on concentric to increase mindfulness and better support the back  DKTC 10x w/ G TheraBall 5"  LTR 10x 5"  Clamshells 20x bilaterally w/ R TB  SLR with ankle DF isometrics 2x10 bilaterally   - Sciatic/tibial nerve glides 2x2 min on left (between SLR sets)  Bent knee fallouts, unilateral x20 with red Theraband (NP)  - bilateral x20 with red TB  Bridging w/ articulation 2 x 10x      Seated supination, plantarflexion x 10 (full range of motion with 1-2 sec hold)  Seated L foot DF w/ Y TB 2 x 10 (NP)    Semi-tandem balance x 30sec  sec bilaterally on blue foam  - head turns x30 sec bilaterally x 2 trials on blue foam  Sit-to-stand 2x10 on blue foam    Standing ankle DF/hip flexion x20 B (full range of motion with 1-2 sec hold)  Standing hip extension, abduction 10x bilaterally with red Theraband   Leg press 20x 20#    Carolyne received the following manual therapy techniques: Myofacial release and Soft tissue Mobilization were applied to the: lumbar spine, hips for 00 minutes, including:  Soft tissue mobilization, cross friction massage lumbar paravertebral muscles, gluteals, lateral quads, lateral hamstrings    PATIENT EDUCATION AND HOME EXERCISES     Home Exercises Provided and Patient Education Provided     Education provided:   - Presentation, prognosis, plan of care, HEP   -Educated pt on the importance of daily stretch to increase the benefit of therapy and positive results.    Written Home Exercises Provided: yes. Exercises were reviewed and Carolyne was able to demonstrate them prior to the end of the session.  Carolyne demonstrated good  understanding of the education provided. See EMR under Patient Instructions for exercises provided during therapy sessions    ASSESSMENT   Pt with less discomfort with LB post session.Pt was challenged " with balance activities with added blue foam with minimal sway, but pt able to self correct. She feels her foot drop is better, but she needs to think about bring her foot up vs it being more automatic.She would benefit from increased lumbar stability to improve mobility and activity tolerance.    Carolyne Is progressing well towards her goals.   Pt prognosis is Good.     Pt will continue to benefit from skilled outpatient physical therapy to address the deficits listed in the problem list box on initial evaluation, provide pt/family education and to maximize pt's level of independence in the home and community environment.     Pt's spiritual, cultural and educational needs considered and pt agreeable to plan of care and goals.    Goals:  Short Term Goals: 4 weeks   Patient will be independent with HEP for symptom management  Patient will increase strength of LLE by at least 1/2 grade via MMT testing to allow for improved performance of ADLs and daily functional tasks  Patient will be able to walk >10 min with grossly symmetrical gait mechanics  Patient will demonstrate tandem stance >20 sec bilaterally to suggest improved static balance  Patient will report >25% improvement in late day fatigue     Long Term Goals: 8 weeks   Patient will be independent with progressive HEP for continued strengthening to support return to previous level of function  Patient will have grossly symmetrical, pain-free range of motion for improved functional mobility  Patient will increase strength of left LE by at least 1 grade via MMT testing to allow for improved performance of ADLs and daily functional tasks  Patient will be able to walk >20 min  Patient will achieve <30% limitation as measured by the FOTO to demonstrate decreased functional disability    PLAN     E-stim for foot drop as indicated (Niuean, NMES)  Ankle DF isometrics seated, standing  Low intensity steady state cardio (stepper, bike, treadmill)      Hannah Reyna,  PTA

## 2023-03-28 ENCOUNTER — CLINICAL SUPPORT (OUTPATIENT)
Dept: REHABILITATION | Facility: HOSPITAL | Age: 68
End: 2023-03-28
Attending: STUDENT IN AN ORGANIZED HEALTH CARE EDUCATION/TRAINING PROGRAM
Payer: MEDICARE

## 2023-03-28 DIAGNOSIS — R26.9 GAIT ABNORMALITY: ICD-10-CM

## 2023-03-28 DIAGNOSIS — R29.898 LEFT LEG WEAKNESS: ICD-10-CM

## 2023-03-28 DIAGNOSIS — M21.372 FOOT DROP, LEFT: Primary | ICD-10-CM

## 2023-03-28 PROCEDURE — 97750 PHYSICAL PERFORMANCE TEST: CPT | Mod: PO

## 2023-03-28 PROCEDURE — 97112 NEUROMUSCULAR REEDUCATION: CPT | Mod: PO,CQ

## 2023-03-28 PROCEDURE — 97110 THERAPEUTIC EXERCISES: CPT | Mod: PO,CQ

## 2023-03-28 NOTE — PROGRESS NOTES
[facility]   Physical Therapy Initial Evaluation      Date: 2/14/2023   Name: Astrid Camacho  [facility] Number: [id number]     Therapy Diagnosis:        Encounter Diagnoses   Name Primary?    Spondylolisthesis of lumbar region      DDD (degenerative disc disease), lumbar      Foot drop, left      Gait abnormality      Left leg weakness        Physician: Leonard rFey MD     Physician Orders: PT Eval and Treat  Medical Diagnosis from Referral:   M43.16 (ICD-10-CM) - Spondylolisthesis of lumbar region   M51.36 (ICD-10-CM) - DDD (degenerative disc disease), lumbar   M21.372 (ICD-10-CM) - Foot drop, left      Evaluation Date: 2/14/2023  Authorization Period Expiration: 02/02/2024  Plan of Care Expiration: 04/14/2023  Progress Note Due: 03/14/2023  Visit # / Visits authorized: 1/1   FOTO: Completed 02/14/2023     Precautions: Standard and Fall     Time In: 1101  Time Out: 1200  Total Appointment Time (timed & untimed codes): 55 minutes        SUBJECTIVE   Date of onset: July 2022     History of current condition - Carolyne reports:   Sudden onset of low back and left leg pain while on vacation (standing up from low toilet). No significant relief from pain medication or oral steroids. Lumbar LEELA 08/26/2022 and 10/07/2022 with good relief of pain, but no significant change in foot drop. Notes gait and balance changes. Altered temperature and increased fatigue at the end of the day.     She feels fairly normal in the morning and after a hot shower she feels great. Starts dragging the leg more and more throughout the day and then her back starts to bother her. Pain is described as aching.      Just retired (previous desk work). Too cold right now so not doing much outside. Wants to be able to walk and hike through the mountains.     Prior Therapy: None  Social History: Loves with spouse, single story home, step-in shower  Occupation: Retired  Prior Level of Function: No significant functional  limitations  Current Level of Function: Limited walking tolerance, altered gait and balance, more time required for ADLs     Pain:  Current 2/10, worst 5/10, best 0/10   Location: bilateral back   Description: Aching and Dull  Aggravating Factors: Walking and Night Time  Easing Factors: pain medication, hot bath, and rest     Red Flag Screening:   Cough  Sneeze  Strain: (--)  Bladder/ bowel: (--)  Falls: (+) mechanical stepping over loose tree limbs  Night pain: (--)  Unexplained weight loss: (--)  General health: Good     Patients goals: Improve gait and balance. Be able to hike in the mountains.     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     Medications:   Astrid has a current medication list which includes the following prescription(s): alprazolam, amlodipine, calcitriol, furosemide, gabapentin, ibuprofen, levothyroxine, losartan-hydrochlorothiazide 100-25 mg, metformin, sotalol, and xarelto.     Allergies:        Review of patient's allergies indicates:   Allergen Reactions    Effexor [venlafaxine]      Codeine Nausea And Vomiting            OBJECTIVE      Compensated Trendelenburg on left. Flexed posture with initial WB on left.      Fatigues with repeated muscle testing on left lower extremity (worse with DF)     Functional Screen:      SFMA FN: functional, nonpainful. FP: functional, painful. DP:  dysfunctional, painful. DN: dysfunctional, nonpainful.   multi-segmental flexion  Dysfunctional, non-painful Flat lumbar, reaching to mid shin   multi-segmental extension Dysfunctional, non-painful limited anterior hip shift   multi-segmental rotation  R: Dysfunctional, non-painful 25% limited  L: Dysfunctional, non-painful 25% limited   SLS R: Dysfunctional, non-painful ~ 5 sec with trunk lean  L: Dysfunctional, non-painful, <3 sec and LOB   Arms Down Deep Squat Dysfunctional, non-painful right-sided weight shift      Myotome/Motor Control Testing:  Myotome Peripheral Right Left   L2 (Hip Flexion) Segmental L1, L2 negative positive      L3 (Hip IR) Obturator negative    positive      L3 (Knee Extension) Femoral negative positive   L4 (Ankle DF) Deep Fibular  negative    positive      L5 (EHL) Deep Fibular negative positive   L5 (Hip ABduction) Superior Gluteal  negative    positive      S1 (Knee Flexion) Tibial negative    positive      S2 (Hip Extension) Inferior Gluteal negative    positive         Hip Range of Motion (deg):     Right Left   Flexion    Extension <5 <5   Internal Rotation WNL WNL   External Rotation WNL WNL         Knee Range of Motion (deg):     Right Left   Flexion WNL    WNL   Extension WNL    WNL         Ankle Range of Motion (deg):     Right Left   Dorsi Flexion WNL    WNL   1st MTP Extension WNL    WNL      Lower Extremity Strength  (R) LE   (L) LE     Hip flexion: 4+/5 Hip flexion: 4/5      Hip extension: 4+/5 Hip extension: 4-/5   Hip Abduction: 4+/5    Hip abduction: 4-/5      Hip ER 4+/5    Hip ER 4/5      Hip IR 4+/5    Hip IR 4/5      Knee flexion: 4+/5    Knee flexion: 4/5      Knee extension: 5/5    Knee extension: 4+/5      Ankle DF: 5/5    Ankle DF: 4/5      Ankle PF: 4+/5    Ankle PF: 4/5      Ankle INV: 5/5    Ankle INV: 4+/5      Ankle EV: 5/5    Ankle EV: 4+/5      FHL: 5/5    FHL: 4+/5      Posterior Tib: 5/5    Posterior Tib: 4+/5         Special Tests:  Hip Scour (-)    CLARE (-)   Thigh thrust (-)   Sacral Thrust (-)   SIJ Comp/Dist (-)   SLR (-)   Slump/Nerve tension (+) but near end range      Muscle Length:    Right Left   Quads (Ely test):  (-) deg (-) deg   Hamstrings (90/90): (10) deg (15) deg   Hip flexors (Umang test): (+) psoas, (-) rectus femoris (+) psoas, (-) rectus femoris      Joint Mobility:  Thoracic: HYPOmobile  Lumbar: WNL     Palpation:  tender to palpation left quadratus lumborum, lumbar paravertebral muscles  Non-tender to left lower extremity   Sensation: Intact to light touch      Limitation/Restriction for FOTO Lower leg Survey     Therapist reviewed FOTO scores for Astrid Camacho on 2/14/2023.   FOTO documents entered into [employer] - see Media section.     Limitation Score: 59%            TREATMENT      Total Treatment time (time-based codes) separate from Evaluation: 30 minutes      Carolyne received the treatments listed below:       Carolyne participated in neuromuscular re-education activities to improve: Balance, Coordination, and motor control for 30 minutes. The following activities were included:     Cues to exhale on concentric to increase mindfulness and better support the back     SLR with ankle DF isometrics 2x5 bilaterally   Articulating bridge 2x5  Bent knee fallouts, unilateral x10  Sciatic/tibial nerve glides x2 min on left  Clamshells x10 bilaterally   Seated ankle DF/PF 2x30 sec (full range of motion with 1-2 sec hold)  Seated supination, plantarflexion (full range of motion with 1-2 sec hold)  Semi-tandem balance x60 sec bilaterally   - head turns x30 sec bilaterally      PATIENT EDUCATION AND HOME EXERCISES      Education provided:   - Presentation, prognosis, plan of care, HEP  - Nerves like space, movement, and blood supply. Feed them throughout the day.     Written Home Exercises Provided: yes. Exercises were reviewed and Carolyne was able to demonstrate them prior to the end of the session.  Carolyne demonstrated good  understanding of the  education provided. See EMR under Patient Instructions for exercises provided during therapy sessions.     Images copyright of [email or web address] or [email or web address]     ASSESSMENT      Astrid is a 67 y.o. female referred to outpatient Physical Therapy with a medical diagnosis of left foot drop. Patient presents with gross motor deficits in the left lower extremity which is affecting ADLs, gait, balance, and leading to compensations that are likely contributing to her low back pain. She would benefit from skilled PT to maximize functional mobility and reduce fall risk.     Patient prognosis is Excellent.      Patient will benefit from skilled outpatient Physical Therapy to address the deficits stated above and in the chart below, provide patient /family education, and to maximize patient's level of independence.      Plan of care discussed with patient: Yes  Patient's spiritual, cultural and educational needs considered and patient is agreeable to the plan of care and goals as stated below:      Anticipated Barriers for therapy: None     Medical Necessity is demonstrated by the following  History  Co-morbidities and personal factors that may impact the plan of care Co-morbidities:   HTN     Personal Factors:   no deficits       low   Examination  Body Structures and Functions, activity limitations and participation restrictions that may impact the plan of care Body Regions:   back  lower extremities     Body Systems:    strength  balance  gait  transfers  motor control     Participation Restrictions:   None     Activity limitations:   Learning and applying knowledge  no deficits     General Tasks and Commands  no deficits     Communication  no deficits     Mobility  walking     Self care  dressing     Domestic Life  shopping  cooking  doing house work (cleaning house, washing dishes, laundry)  assisting others     Interactions/Relationships  no deficits     Life Areas  no deficits     Community and Social  Life  community life  recreation and leisure             moderate   Clinical Presentation evolving clinical presentation with changing clinical characteristics moderate   Decision Making/ Complexity Score: low      Goals:  Short Term Goals: 4 weeks   Patient will be independent with HEP for symptom management  Patient will increase strength of LLE by at least 1/2 grade via MMT testing to allow for improved performance of ADLs and daily functional tasks  Patient will be able to walk >10 min with grossly symmetrical gait mechanics  Patient will demonstrate tandem stance >20 sec bilaterally to suggest improved static balance  Patient will report >25% improvement in late day fatigue     Long Term Goals: 8 weeks   Patient will be independent with progressive HEP for continued strengthening to support return to previous level of function  Patient will have grossly symmetrical, pain-free range of motion for improved functional mobility  Patient will increase strength of left LE by at least 1 grade via MMT testing to allow for improved performance of ADLs and daily functional tasks  Patient will be able to walk >20 min  Patient will achieve <30% limitation as measured by the FOTO to demonstrate decreased functional disability     PLAN   Plan of care Certification: 2/14/2023 to 04/14/2023.     Outpatient Physical Therapy 1 times weekly for 8 weeks to include the following interventions: Electrical Stimulation Dry needling/E-stim, Gait Training, Manual Therapy, Moist Heat/ Ice, Neuromuscular Re-ed, Patient Education, Self Care, Therapeutic Activities, and Therapeutic Exercise.      Ravindra Aviles, PT        I CERTIFY THE NEED FOR THESE SERVICES FURNISHED UNDER THIS PLAN OF TREATMENT AND WHILE UNDER MY CARE   Physician's comments:      Physician's Signature: ___________________________________________________

## 2023-03-28 NOTE — PLAN OF CARE
[facility]   Physical Therapy Progress Note and Plan of Care update     Date: 2/14/2023   Name: Astrid Camacho  Clinic Number: [id number]     Therapy Diagnosis:        Encounter Diagnoses   Name Primary?    Spondylolisthesis of lumbar region      DDD (degenerative disc disease), lumbar      Foot drop, left      Gait abnormality      Left leg weakness        Physician: Leonard Frey MD     Physician Orders: PT Eval and Treat  Medical Diagnosis from Referral:   M43.16 (ICD-10-CM) - Spondylolisthesis of lumbar region   M51.36 (ICD-10-CM) - DDD (degenerative disc disease), lumbar   M21.372 (ICD-10-CM) - Foot drop, left      Evaluation Date: 2/14/2023  Authorization Period Expiration: 02/02/2024  Plan of Care Expiration: 04/14/2023 ---> 04/30/2023 (extended)  Progress Note Due: 03/14/2023  Visit # / Visits authorized: 6/20  FOTO: Completed 03/28/2023     Precautions: Standard and Fall     Time In: 0915  Time Out: 0930  Total Appointment Time (timed & untimed codes): 15 minutes        SUBJECTIVE   Date of onset: July 2022     History of current condition - Carolyne reports:     Good progress - less fatigue at end of the day, improved walking with more control over left ankle. She feels exercises are helping.    From evaluation:  Sudden onset of low back and left leg pain while on vacation (standing up from low toilet). No significant relief from pain medication or oral steroids. Lumbar LEELA 08/26/2022 and 10/07/2022 with good relief of pain, but no significant change in foot drop. Notes gait and balance changes. Altered temperature and increased fatigue at the end of the day.     She feels fairly normal in the morning and after a hot shower she feels great. Starts dragging the leg more and more throughout the day and then her back starts to bother her. Pain is described as aching.      Just retired (previous desk work). Too cold right now so not doing much outside. Wants to be able to walk and  hike through the mountains.     Prior Therapy: None  Social History: Loves with spouse, single story home, step-in shower  Occupation: Retired  Prior Level of Function: No significant functional limitations  Current Level of Function: Limited walking tolerance, altered gait and balance, more time required for ADLs     Pain:  Current 0/10, worst 2/10, best 0/10   Location: bilateral back   Description: Aching and Dull  Aggravating Factors: Walking and Night Time  Easing Factors: pain medication, hot bath, and rest     Red Flag Screening:   Cough  Sneeze  Strain: (--)  Bladder/ bowel: (--)  Falls: (+) mechanical stepping over loose tree limbs  Night pain: (--)  Unexplained weight loss: (--)  General health: Good     Patients goals: Improve gait and balance. Be able to hike in the mountains.     Imaging: See [facility]     Medical History:        Past Medical History:   Diagnosis Date    Atrial fibrillation 08/11/2012    Bell's palsy      Coronary artery disease      HTN (hypertension) 05/08/2002     from [facility]     Hyperglycemia      Nerve palsy       3rd     Situational anxiety 10/29/2010     from [facility]     Situational depression 10/29/2010     from [facility]          Surgical History:   Astrid Camacho  has a past surgical history that includes Coronary stent placement (2005); Hysterectomy; Appendectomy; Thyroidectomy (10/13/2019); Transforaminal epidural injection of steroid (Left, 8/26/2022); and Transforaminal epidural injection of steroid (Left, 10/7/2022).     Medications:   Astrid has a current medication list which includes the following prescription(s): alprazolam, amlodipine, calcitriol, furosemide, gabapentin, ibuprofen, levothyroxine, losartan-hydrochlorothiazide 100-25 mg, metformin, sotalol, and xarelto.     Allergies:        Review of patient's allergies indicates:   Allergen Reactions    Effexor [venlafaxine]      Codeine Nausea And Vomiting            OBJECTIVE      Compensated Trendelenburg on left.      Fatigues  with repeated muscle testing on left lower extremity (worse with DF)     Functional Screen:      SFMA FN: functional, nonpainful. FP: functional, painful. DP: dysfunctional, painful. DN: dysfunctional, nonpainful.   multi-segmental flexion  Dysfunctional, non-painful Flat lumbar, reaching to mid shin   multi-segmental extension Dysfunctional, non-painful limited anterior hip shift   multi-segmental rotation  R: Dysfunctional, non-painful 25% limited  L: Dysfunctional, non-painful 25% limited   SLS R: Dysfunctional, non-painful ~ 10 sec with trunk lean  L: Dysfunctional, non-painful, ~5 sec and LOB   Arms Down Deep Squat Dysfunctional, non-painful right-sided weight shift      Myotome/Motor Control Testing:  Myotome Peripheral Right Left   L2 (Hip Flexion) Segmental L1, L2 negative positive      L3 (Hip IR) Obturator negative    positive      L3 (Knee Extension) Femoral negative positive   L4 (Ankle DF) Deep Fibular  negative    positive      L5 (EHL) Deep Fibular negative positive   L5 (Hip ABduction) Superior Gluteal  negative    positive      S1 (Knee Flexion) Tibial negative    positive      S2 (Hip Extension) Inferior Gluteal negative    positive         Hip Range of Motion (deg):     Right Left   Flexion    Extension <5 <5   Internal Rotation WNL WNL   External Rotation WNL WNL         Knee Range of Motion (deg):     Right Left   Flexion WNL    WNL   Extension WNL    WNL         Ankle Range of Motion (deg):     Right Left   Dorsi Flexion WNL    WNL   1st MTP Extension WNL    WNL      Lower Extremity Strength  (R) LE   (L) LE     Hip flexion: 4+/5 Hip flexion: 4/5      Hip extension: 4+/5 Hip extension: 4-/5   Hip Abduction: 4+/5    Hip abduction: 4-/5      Hip ER 4+/5    Hip ER 4/5      Hip IR 4+/5    Hip IR 4/5      Knee flexion: 4+/5    Knee flexion: 4/5      Knee extension: 5/5    Knee extension: 4+/5      Ankle DF: 5/5    Ankle DF: 4+/5      Ankle PF: 4+/5    Ankle PF: 4/5      Ankle INV: 5/5     Ankle INV: 4+/5      Ankle EV: 5/5    Ankle EV: 4+/5      FHL: 5/5    FHL: 4+/5      Posterior Tib: 5/5    Posterior Tib: 4+/5         Special Tests:  Hip Scour (-)   CLARE (-)   Thigh thrust (-)   Sacral Thrust (-)   SIJ Comp/Dist (-)   SLR (-)   Slump/Nerve tension (+) but near end range      Muscle Length:    Right Left   Quads (Ely test):  (-) deg (-) deg   Hamstrings (90/90): (10) deg (15) deg   Hip flexors (Umang test): (+) psoas, (-) rectus femoris (+) psoas, (-) rectus femoris      Joint Mobility:  Thoracic: HYPOmobile  Lumbar: WNL     Palpation:  tender to palpation left quadratus lumborum, lumbar paravertebral muscles  Non-tender to left lower extremity   Sensation: Intact to light touch      Limitation/Restriction for FOTO Lower leg Survey     Therapist reviewed FOTO scores for Astrid Camacho on 2/14/2023, 03/28/2023.   FOTO documents entered into [employer] - see Media section.     Limitation Score: 59% ---> 45%            TREATMENT    See Treatment note     PATIENT EDUCATION AND HOME EXERCISES      Education provided:   - Presentation, prognosis, plan of care, HEP  - Nerves like space, movement, and blood supply. Feed them throughout the day.     Written Home Exercises Provided: yes. Exercises were reviewed and Carolyne was able to demonstrate them prior to the end of the session.  Carolyne demonstrated good  understanding of the education provided. See EMR under Patient Instructions for exercises provided during therapy sessions.     Images copyright of [email or web address] or [email or web address]     ASSESSMENT      Astrid is a 67 y.o. female referred to outpatient Physical Therapy with a medical diagnosis of left foot drop. She has made progress with balance, strength, and overall activity tolerance since her initial evaluation. She will continue to benefit from skilled PT to maximize strength and stability of the LLE and reduce fall risk.     Patient prognosis is Excellent.      Patient will benefit from skilled  outpatient Physical Therapy to address the deficits stated above and in the chart below, provide patient /family education, and to maximize patient's level of independence.      Plan of care discussed with patient: Yes  Patient's spiritual, cultural and educational needs considered and patient is agreeable to the plan of care and goals as stated below:      Anticipated Barriers for therapy: None     Medical Necessity is demonstrated by the following  History  Co-morbidities and personal factors that may impact the plan of care Co-morbidities:   HTN     Personal Factors:   no deficits       low   Examination  Body Structures and Functions, activity limitations and participation restrictions that may impact the plan of care Body Regions:   back  lower extremities     Body Systems:    strength  balance  gait  transfers  motor control     Participation Restrictions:   None     Activity limitations:   Learning and applying knowledge  no deficits     General Tasks and Commands  no deficits     Communication  no deficits     Mobility  walking     Self care  dressing     Domestic Life  shopping  cooking  doing house work (cleaning house, washing dishes, laundry)  assisting others     Interactions/Relationships  no deficits     Life Areas  no deficits     Community and Social Life  community life  recreation and leisure             moderate   Clinical Presentation evolving clinical presentation with changing clinical characteristics moderate   Decision Making/ Complexity Score: low      Goals:  Short Term Goals: 4 weeks   Patient will be independent with HEP for symptom management  Patient will increase strength of LLE by at least 1/2 grade via MMT testing to allow for improved performance of ADLs and daily functional tasks PROGRESSING  Patient will be able to walk >10 min with grossly symmetrical gait mechanics PROGRESSING  Patient will demonstrate tandem stance >20 sec bilaterally to suggest improved static balance MET  03/28/2023  Patient will report >25% improvement in late day fatigue MET 03/28/2023     Long Term Goals: 8 weeks   Patient will be independent with progressive HEP for continued strengthening to support return to previous level of function  Patient will have grossly symmetrical, pain-free range of motion for improved functional mobility  Patient will increase strength of left LE by at least 1 grade via MMT testing to allow for improved performance of ADLs and daily functional tasks  Patient will be able to walk >20 min  Patient will achieve <30% limitation as measured by the FOTO to demonstrate decreased functional disability     PLAN   Plan of care Certification: 2/14/2023 to 04/30/2023.     Outpatient Physical Therapy 1 times weekly for 10 weeks to include the following interventions: Electrical Stimulation Dry needling/E-stim, Gait Training, Manual Therapy, Moist Heat/ Ice, Neuromuscular Re-ed, Patient Education, Self Care, Therapeutic Activities, and Therapeutic Exercise.      Ravindra Aviles, PT        I CERTIFY THE NEED FOR THESE SERVICES FURNISHED UNDER THIS PLAN OF TREATMENT AND WHILE UNDER MY CARE   Physician's comments:      Physician's Signature: ___________________________________________________

## 2023-03-28 NOTE — PROGRESS NOTES
[facility]   Physical Therapy Treatment Note     Name: Astrid Camacho  [facility] Number: [id number]    Therapy Diagnosis:   Encounter Diagnoses   Name Primary?    Foot drop, left Yes    Gait abnormality     Left leg weakness          Physician: Leonard Frey MD    Visit Date: 3/28/2023    Physician Orders: PT Eval and Treat  Medical Diagnosis from Referral:   M43.16 (ICD-10-CM) - Spondylolisthesis of lumbar region   M51.36 (ICD-10-CM) - DDD (degenerative disc disease), lumbar   M21.372 (ICD-10-CM) - Foot drop, left      Evaluation Date: 2/14/2023  Authorization Period Expiration:   12/29/2023  Plan of Care Expiration: 04/14/2023  Progress Note Due: 03/14/2023  Visit # / Visits authorized: 4/20  FOTO: Completed 02/14/2023     Precautions: Standard and Fall    Time In: 9:15  Time Out: 10:00  Total Billable Time: 45 minutes      SUBJECTIVE     Pt reports: she is not having LBP, she just feels a little discomfort. She liked the leg press she performed last visit due to her legs and hips felt better.     She was compliant with home exercise program.  Response to previous treatment: Soreness  Functional change: Too soon to assess    Pain: 0/10  Location: bilateral back, left lower extremity      OBJECTIVE     Objective Measures updated at progress report unless specified.   SFMA FN: functional, nonpainful. FP: functional, painful. DP: dysfunctional, painful. DN: dysfunctional, nonpainful.   multi-segmental flexion  Dysfunctional, non-painful Flat lumbar, reaching to mid shin   multi-segmental extension Dysfunctional, non-painful limited anterior hip shift   multi-segmental rotation  R: Dysfunctional, non-painful 25% limited  L: Dysfunctional, non-painful 25% limited   SLS R: Dysfunctional, non-painful ~ 5 sec with trunk lean  L: Dysfunctional, non-painful, <3 sec and LOB   Arms Down Deep Squat        TREATMENT     Total Treatment time (time-based codes) separate from Evaluation: 45 minutes  "    Carolyne received the treatments listed below:      Carolyne participated in neuromuscular re-education activities to improve: Balance, Coordination, and motor control for 45 minutes. The following activities were included:     Cues to exhale on concentric to increase mindfulness and better support the back    Seated supination, plantarflexion x 10 (full range of motion with 1-2 sec hold)  Seated L foot DF and EV w/ R TB 2 x 10   Standing hip extension, abduction 2 x  10x bilaterally with red Theraband   Standing ankle DF/hip flexion x20 B (full range of motion with 1-2 sec hold)  Leg press B LE 20x 20#, SL 5x 20#  HS curl 20x 20#  Step up/downs 2 x 10 6" with 1 UE support    NP:  DKTC 10x w/ G TheraBall 5"  LTR 10x 5"  Clamshells 20x bilaterally w/ R TB  SLR with ankle DF isometrics 2x10 bilaterally   - Sciatic/tibial nerve glides 2x2 min on left (between SLR sets)  Bent knee fallouts, unilateral x20 with red Theraband (NP)  - bilateral x20 with red TB  Bridging w/ articulation 2 x 10x    Carolyne received the following manual therapy techniques: Myofacial release and Soft tissue Mobilization were applied to the: lumbar spine, hips for 00 minutes, including:  Soft tissue mobilization, cross friction massage lumbar paravertebral muscles, gluteals, lateral quads, lateral hamstrings    PATIENT EDUCATION AND HOME EXERCISES     Home Exercises Provided and Patient Education Provided     Education provided:   - Presentation, prognosis, plan of care, HEP   -Educated pt on the importance of daily stretch to increase the benefit of therapy and positive results.    Written Home Exercises Provided: yes. Exercises were reviewed and Carolyne was able to demonstrate them prior to the end of the session.  Carolyne demonstrated good  understanding of the education provided. See EMR under Patient Instructions for exercises provided during therapy sessions    ASSESSMENT   Pt with minimal fatigue post session. L LE weaker than R when doing " the LE therex. Did cue her to perform neutral to DF of B LE with step ups and standing ups.  She feels her foot drop is becoming more natural, but fatigues in the afternoon. She would benefit from continue B LE therex to maximize strength.     Carolyne Is progressing well towards her goals.   Pt prognosis is Good.     Pt will continue to benefit from skilled outpatient physical therapy to address the deficits listed in the problem list box on initial evaluation, provide pt/family education and to maximize pt's level of independence in the home and community environment.     Pt's spiritual, cultural and educational needs considered and pt agreeable to plan of care and goals.    Goals:  Short Term Goals: 4 weeks   Patient will be independent with HEP for symptom management  Patient will increase strength of LLE by at least 1/2 grade via MMT testing to allow for improved performance of ADLs and daily functional tasks  Patient will be able to walk >10 min with grossly symmetrical gait mechanics  Patient will demonstrate tandem stance >20 sec bilaterally to suggest improved static balance  Patient will report >25% improvement in late day fatigue     Long Term Goals: 8 weeks   Patient will be independent with progressive HEP for continued strengthening to support return to previous level of function  Patient will have grossly symmetrical, pain-free range of motion for improved functional mobility  Patient will increase strength of left LE by at least 1 grade via MMT testing to allow for improved performance of ADLs and daily functional tasks  Patient will be able to walk >20 min  Patient will achieve <30% limitation as measured by the FOTO to demonstrate decreased functional disability    PLAN     E-stim for foot drop as indicated (Senegalese, NMES)  Ankle DF isometrics seated, standing  Low intensity steady state cardio (stepper, bike, treadmill)      Hannah Reyna, PTA

## 2023-04-04 ENCOUNTER — CLINICAL SUPPORT (OUTPATIENT)
Dept: REHABILITATION | Facility: HOSPITAL | Age: 68
End: 2023-04-04
Attending: STUDENT IN AN ORGANIZED HEALTH CARE EDUCATION/TRAINING PROGRAM
Payer: MEDICARE

## 2023-04-04 DIAGNOSIS — R29.898 LEFT LEG WEAKNESS: ICD-10-CM

## 2023-04-04 DIAGNOSIS — R26.9 GAIT ABNORMALITY: ICD-10-CM

## 2023-04-04 DIAGNOSIS — M21.372 FOOT DROP, LEFT: Primary | ICD-10-CM

## 2023-04-04 PROCEDURE — 97110 THERAPEUTIC EXERCISES: CPT | Mod: PO

## 2023-04-04 PROCEDURE — 97112 NEUROMUSCULAR REEDUCATION: CPT | Mod: PO

## 2023-04-04 NOTE — PROGRESS NOTES
[facility]   Physical Therapy Treatment Note     Name: Astrid Camacho  [facility] Number: [id number]    Therapy Diagnosis:   Encounter Diagnoses   Name Primary?    Foot drop, left Yes    Gait abnormality     Left leg weakness      Physician: Leonard Frey MD    Visit Date: 4/4/2023    Physician Orders: PT Eval and Treat  Medical Diagnosis from Referral:   M43.16 (ICD-10-CM) - Spondylolisthesis of lumbar region   M51.36 (ICD-10-CM) - DDD (degenerative disc disease), lumbar   M21.372 (ICD-10-CM) - Foot drop, left      Evaluation Date: 2/14/2023  Authorization Period Expiration:   12/29/2023  Plan of Care Expiration: 04/14/2023  Progress Note Due: 03/14/2023  Visit # / Visits authorized: 1/1 7/20  FOTO: Completed 02/14/2023     Precautions: Standard and Fall    Time In: 9:15 (Patient late)  Time Out: 10:00  Total Billable Time: 45 minutes      SUBJECTIVE     Pt reports: She was at her camp and did a bunch of rounds of stairs. She could clear her left foot much more consistently. Good fatigue with the exercise and no low back issues.  She was compliant with home exercise program.  Response to previous treatment: Soreness  Functional change: Too soon to assess    Pain: 0/10  Location: bilateral back, left lower extremity      OBJECTIVE     Objective Measures updated at progress report unless specified.   SFMA FN: functional, nonpainful. FP: functional, painful. DP: dysfunctional, painful. DN: dysfunctional, nonpainful.   multi-segmental flexion  Dysfunctional, non-painful Flat lumbar, reaching to mid shin   multi-segmental extension Dysfunctional, non-painful limited anterior hip shift   multi-segmental rotation  R: Dysfunctional, non-painful 25% limited  L: Dysfunctional, non-painful 25% limited   SLS R: Dysfunctional, non-painful ~ 5 sec with trunk lean  L: Dysfunctional, non-painful, <3 sec and LOB   Arms Down Deep Squat        TREATMENT     Total Treatment time (time-based codes) separate  "from Evaluation: 45 minutes     Carolyne received the treatments listed below:      Carolyne participated in therapeutic exercise to improve strength, range of motion, load tolerance for 20 minutes:    Leg press B LE 20x 30#, SL 5x 20#  Knee extension (cue for ankle DF through both lift and lower)   - 30# bilaterally x15  - 20# 2up/1down 2x10 bilaterally   HS curl   - 30# bilaterally  x20  - 20# 2down/1up 2x10 bilaterally         Carolyne participated in neuromuscular re-education activities to improve: Balance, Coordination, and motor control for 25 minutes. The following activities were included:     Focus on progression to single leg balance - reduce upper extremity support    Standing ankle DF/hip flexion 4x5 B (full range of motion with 1-2 sec hold) with red TB at mid-foot  Standing hip abduction 2x10 bilaterally with red Theraband at mid-foot   - working toward no upper extremity support  Standing hip extension with red Theraband at ankles 2x10  - working toward no upper extremity support     Step up/downs 2 x 10 6" with 1 UE support    Carolyne received the following manual therapy techniques: Myofacial release and Soft tissue Mobilization were applied to the: lumbar spine, hips for 00 minutes, including:  Soft tissue mobilization, cross friction massage lumbar paravertebral muscles, gluteals, lateral quads, lateral hamstrings    PATIENT EDUCATION AND HOME EXERCISES     Home Exercises Provided and Patient Education Provided     Education provided:   - Presentation, prognosis, plan of care, HEP   -Educated pt on the importance of daily stretch to increase the benefit of therapy and positive results.    Written Home Exercises Provided: yes. Exercises were reviewed and Carolyne was able to demonstrate them prior to the end of the session.  Carolyne demonstrated good  understanding of the education provided. See EMR under Patient Instructions for exercises provided during therapy sessions    ASSESSMENT   Patient " demonstrates consistent toe clearance with step-ups. She is feeling like she is making progress and will check back in 2 weeks. Anticipate discharge.     Carolyne Is progressing well towards her goals.   Pt prognosis is Good.     Pt will continue to benefit from skilled outpatient physical therapy to address the deficits listed in the problem list box on initial evaluation, provide pt/family education and to maximize pt's level of independence in the home and community environment.     Pt's spiritual, cultural and educational needs considered and pt agreeable to plan of care and goals.    Goals:  Short Term Goals: 4 weeks   Patient will be independent with HEP for symptom management MET 04/04/2023  Patient will increase strength of LLE by at least 1/2 grade via MMT testing to allow for improved performance of ADLs and daily functional tasks  Patient will be able to walk >10 min with grossly symmetrical gait mechanics PROGRESSING  Patient will demonstrate tandem stance >20 sec bilaterally to suggest improved static balance MET 04/04/2023  Patient will report >25% improvement in late day fatigue MET 04/04/2023     Long Term Goals: 8 weeks   Patient will be independent with progressive HEP for continued strengthening to support return to previous level of function  Patient will have grossly symmetrical, pain-free range of motion for improved functional mobility  Patient will increase strength of left LE by at least 1 grade via MMT testing to allow for improved performance of ADLs and daily functional tasks  Patient will be able to walk >20 min  Patient will achieve <30% limitation as measured by the FOTO to demonstrate decreased functional disability    PLAN     E-stim for foot drop as indicated (Japanese, NMES)  Ankle DF isometrics seated, standing  Low intensity steady state cardio (stepper, bike, treadmill)      Ravindra Aviles, PT

## 2023-04-25 ENCOUNTER — CLINICAL SUPPORT (OUTPATIENT)
Dept: REHABILITATION | Facility: HOSPITAL | Age: 68
End: 2023-04-25
Attending: STUDENT IN AN ORGANIZED HEALTH CARE EDUCATION/TRAINING PROGRAM
Payer: MEDICARE

## 2023-04-25 DIAGNOSIS — R29.898 LEFT LEG WEAKNESS: ICD-10-CM

## 2023-04-25 DIAGNOSIS — M21.372 FOOT DROP, LEFT: Primary | ICD-10-CM

## 2023-04-25 DIAGNOSIS — R26.9 GAIT ABNORMALITY: ICD-10-CM

## 2023-04-25 PROCEDURE — 97112 NEUROMUSCULAR REEDUCATION: CPT | Mod: PO

## 2023-04-25 NOTE — PROGRESS NOTES
[facility]   Physical Therapy Discharge Note     Name: Astrid Camacho  [facility] Number: [id number]    Therapy Diagnosis:   Encounter Diagnoses   Name Primary?    Foot drop, left Yes    Gait abnormality     Left leg weakness        Physician: Leonard Frey MD    Visit Date: 4/25/2023    Physician Orders: PT Eval and Treat  Medical Diagnosis from Referral:   M43.16 (ICD-10-CM) - Spondylolisthesis of lumbar region   M51.36 (ICD-10-CM) - DDD (degenerative disc disease), lumbar   M21.372 (ICD-10-CM) - Foot drop, left      Evaluation Date: 2/14/2023  Authorization Period Expiration:   12/29/2023  Plan of Care Expiration: 04/14/2023  Progress Note Due: 03/14/2023  Visit # / Visits authorized: 1/1 8/20  FOTO: Completed 02/14/2023     Precautions: Standard and Fall    Time In: 1100  Time Out: 1138  Total Billable Time: 38 minutes      SUBJECTIVE     Pt reports: Since her last session, she has been working on her balance and HEP. She feels no pain in her back or leg. She has noticed less fatigue in her foot and leg at the end of the day. Denies any new falls or near falls.  She was compliant with home exercise program.  Response to previous treatment: Soreness  Functional change: Improved walking, balance    Pain: 0/10  Location: bilateral back, left lower extremity      OBJECTIVE   More symmetrical gait, improved left foot clearance.      Functional Screen:      SFMA FN: functional, nonpainful. FP: functional, painful. DP: dysfunctional, painful. DN: dysfunctional, nonpainful.   multi-segmental flexion  Dysfunctional, non-painful Flat lumbar, reaching to mid shin   multi-segmental extension Dysfunctional, non-painful limited anterior hip shift   multi-segmental rotation  R: Dysfunctional, non-painful 25% limited  L: Dysfunctional, non-painful 25% limited   SLS R: Dysfunctional, non-painful 15-20 sec  L: Dysfunctional, non-painful, 5-10 sec trunk lean   Arms Down Deep Squat Dysfunctional,  non-painful grossly symmetrical      Myotome/Motor Control Testing:  Myotome Peripheral Right Left   L2 (Hip Flexion) Segmental L1, L2 negative positive      L3 (Hip IR) Obturator negative    positive      L3 (Knee Extension) Femoral negative positive   L4 (Ankle DF) Deep Fibular  negative    positive      L5 (EHL) Deep Fibular negative positive   L5 (Hip ABduction) Superior Gluteal  negative    positive      S1 (Knee Flexion) Tibial negative    positive      S2 (Hip Extension) Inferior Gluteal negative    positive         Hip Range of Motion (deg):     Right Left   Flexion    Extension <5 <5   Internal Rotation WNL WNL   External Rotation WNL WNL         Knee Range of Motion (deg):     Right Left   Flexion WNL    WNL   Extension WNL    WNL         Ankle Range of Motion (deg):     Right Left   Dorsi Flexion WNL    WNL   1st MTP Extension WNL    WNL      Lower Extremity Strength  (R) LE   (L) LE     Hip flexion: 4+/5 Hip flexion: 4/5      Hip extension: 4+/5 Hip extension: 4/5   Hip Abduction: 4+/5    Hip abduction: 4/5      Hip ER 5/5    Hip ER 4+/5      Hip IR 5/5    Hip IR 4+/5      Knee flexion: 55    Knee flexion: 4+/5      Knee extension: 5/5    Knee extension: 5/5      Ankle DF: 5/5    Ankle DF: 4+/5      Ankle PF: 4+/5    Ankle PF: 4/5      Ankle INV: 5/5    Ankle INV: 4+/5      Ankle EV: 5/5    Ankle EV: 4+/5      FHL: 5/5    FHL: 4+/5      Posterior Tib: 5/5    Posterior Tib: 4+/5         Joint Mobility:  Thoracic: HYPOmobile  Lumbar: WNL     Palpation:  NON-tender to palpation left quadratus lumborum, lumbar paravertebral muscles  Non-tender to left lower extremity   Sensation: Intact to light touch      Limitation/Restriction for FOTO Lower leg Survey     Therapist reviewed FOTO scores for Astrid Camacho on 2/14/2023.   FOTO documents entered into [employer] - see Media section.     Limitation Score: 59% ---> 32%           TREATMENT     Total Treatment time (time-based codes) separate from Evaluation: 38  "minutes     Carolyne received the treatments listed below:      Carolyne participated in neuromuscular re-education activities to improve: Balance, Coordination, and motor control for 38 minutes. The following activities were included:  Review of HEP     Focus on progression to single leg balance - reduce upper extremity support    Standing ankle DF/hip flexion 4x5 B (full range of motion with 1-2 sec hold) with red TB at mid-foot  Standing hip abduction 2x10 bilaterally with red Theraband at mid-foot   - working toward no upper extremity support  Standing hip extension with red Theraband at ankles 2x10  - working toward no upper extremity support   Semi-tandem balance x3min  - ways to progress: head turns, reaching, weight shifts  Tandem balance x3 min  - ways to progress: head turns, reaching, weight shifts  Single leg balance x3 min  - cues for use of back leg as "kickstand" for intermittent assistance to be able to work on balance for longer periods    Carolyne received the following manual therapy techniques: Myofacial release and Soft tissue Mobilization were applied to the: lumbar spine, hips for 00 minutes, including:  Soft tissue mobilization, cross friction massage lumbar paravertebral muscles, gluteals, lateral quads, lateral hamstrings    PATIENT EDUCATION AND HOME EXERCISES     Home Exercises Provided and Patient Education Provided     Education provided:   - Presentation, prognosis, plan of care, HEP   -Educated pt on the importance of daily stretch to increase the benefit of therapy and positive results.    Written Home Exercises Provided: yes. Exercises were reviewed and Carolyne was able to demonstrate them prior to the end of the session.  Carolyne demonstrated good  understanding of the education provided. See EMR under Patient Instructions for exercises provided during therapy sessions    ASSESSMENT   Patient demonstrates improvements in strength and dynamic stability to allow for improved gait mechanics " and stair navigation. She reports improvements in overall endurance and has no complaints of pain in the low back left lower extremity. She has made excellent progress toward her goals and is considered discharged from skilled PT.    Carolyne Is progressing well towards her goals.   Pt prognosis is Good.     Pt's spiritual, cultural and educational needs considered and pt agreeable to plan of care and goals.    Goals:  Short Term Goals: 4 weeks   Patient will be independent with HEP for symptom management MET 04/04/2023  Patient will increase strength of LLE by at least 1/2 grade via MMT testing to allow for improved performance of ADLs and daily functional tasks  Patient will be able to walk >10 min with grossly symmetrical gait mechanics PROGRESSING  Patient will demonstrate tandem stance >20 sec bilaterally to suggest improved static balance MET 04/04/2023  Patient will report >25% improvement in late day fatigue MET 04/04/2023     Long Term Goals: 8 weeks   Patient will be independent with progressive HEP for continued strengthening to support return to previous level of function MET 04/25/2023  Patient will have grossly symmetrical, pain-free range of motion for improved functional mobility MET 04/25/2023  Patient will increase strength of left LE by at least 1 grade via MMT testing to allow for improved performance of ADLs and daily functional tasks PROGRESSING  Patient will be able to walk >20 min MET 04/25/2023  Patient will achieve <30% limitation as measured by the FOTO to demonstrate decreased functional disability PARTIALLY MET    PLAN   Discharge from PT    Ravindra Aviles, PT

## 2023-04-25 NOTE — PATIENT INSTRUCTIONS
Access Code: [email or web address]  URL: [email or web address]  Date: 04/25/2023  Prepared by: Ravindra Aviles    Exercises  - Single Leg Balance Alphabet  - 1 x daily - 4-7 x weekly - 2-3 sets - 10-15 reps - 5 hold - 3/10 intensity  - Wide Tandem Stance with Eyes Open  - 1 x daily - 4-7 x weekly - 2-3 sets - 10-15 reps - 5 hold - 3/10 intensity  - Seated Ankle Pumps  - 2-3 x daily - 4-7 x weekly - 2-3 sets - 10-15 reps - 5 hold - 3/10 intensity  - Toe Raises with Counter Support  - 1 x daily - 4-7 x weekly - 2-3 sets - 10-15 reps - 5 hold - 3/10 intensity  - Marching with Resistance  - 1 x daily - 2-3 x weekly - 2-3 sets - 10-15 reps - 5 hold - 3/10 intensity  - Standing Hip Abduction Kicks  - 1 x daily - 2-3 x weekly - 2-3 sets - 10-15 reps - 5 hold - 3/10 intensity  - Standing Hip Extension with Counter Support  - 1 x daily - 2-3 x weekly - 2-3 sets - 10-15 reps - 5 hold - 3/10 intensity  - Seated Toe Curl  - 2-3 x daily - 4-7 x weekly - 2-3 sets - 10-15 reps - 5 hold - 3/10 intensity  - Lower Trunk Rotations  - 1 x daily - 4-7 x weekly - 2-3 sets - 10-15 reps - 5 hold - 3/10 intensity  - Supine Sciatic Nerve Glide  - 1 x daily - 4-7 x weekly - 1-2 sets - 5-10 reps - 5 hold - 3/10 intensity

## 2023-05-19 PROBLEM — E03.9 HYPOTHYROIDISM: Status: ACTIVE | Noted: 2023-05-19

## 2023-05-19 PROBLEM — F41.9 ANXIETY: Status: ACTIVE | Noted: 2023-05-19

## 2023-06-13 PROBLEM — E04.2 MULTINODULAR GOITER: Status: RESOLVED | Noted: 2019-09-17 | Resolved: 2023-06-13

## 2023-06-13 PROBLEM — E05.90 HYPERTHYROIDISM: Status: RESOLVED | Noted: 2019-09-17 | Resolved: 2023-06-13

## 2023-06-13 PROBLEM — E11.9 TYPE 2 DIABETES MELLITUS WITHOUT COMPLICATION: Status: ACTIVE | Noted: 2021-08-06

## (undated) DEVICE — MARKER SKIN STND TIP BLUE BARR

## (undated) DEVICE — TOWEL OR DISP STRL BLUE 4/PK

## (undated) DEVICE — TRAY NERVE BLOCK

## (undated) DEVICE — APPLICATOR CHLORAPREP CLR 10.5

## (undated) DEVICE — GLOVE SURGICAL LATEX SZ 7

## (undated) DEVICE — NDL SPINAL SPINOCAN 22GX3.5